# Patient Record
Sex: FEMALE | Race: WHITE | Employment: UNEMPLOYED | URBAN - METROPOLITAN AREA
[De-identification: names, ages, dates, MRNs, and addresses within clinical notes are randomized per-mention and may not be internally consistent; named-entity substitution may affect disease eponyms.]

---

## 2017-02-28 ENCOUNTER — PATIENT MESSAGE (OUTPATIENT)
Dept: FAMILY MEDICINE CLINIC | Facility: CLINIC | Age: 42
End: 2017-02-28

## 2017-03-01 NOTE — TELEPHONE ENCOUNTER
From: Elizabeth Valente  To: Jennifer Vasquez MD  Sent: 2/28/2017 4:17 PM CST  Subject: Prescription Question    Hello. I wanted to refill my prescription for clonazepam .5 mg and it said I had no refillable prescriptions on file. I'm confused.  On the maryann

## 2017-03-02 RX ORDER — CLONAZEPAM 0.5 MG/1
TABLET ORAL
Qty: 30 TABLET | Refills: 1
Start: 2017-03-02

## 2017-03-02 RX ORDER — CLONAZEPAM 0.5 MG/1
TABLET ORAL
Qty: 30 TABLET | Refills: 0 | Status: SHIPPED
Start: 2017-03-02 | End: 2017-04-23

## 2017-04-24 NOTE — TELEPHONE ENCOUNTER
Refill request, please approve or deny  LOV- 11/15/2016, was due to follow up in 3 months   No follow up scheduled

## 2017-04-25 RX ORDER — CLONAZEPAM 0.5 MG/1
TABLET ORAL
Qty: 30 TABLET | Refills: 0 | Status: SHIPPED | OUTPATIENT
Start: 2017-04-25 | End: 2017-07-26

## 2017-04-25 RX ORDER — CLONAZEPAM 0.5 MG/1
TABLET ORAL
Qty: 30 TABLET | Refills: 0
Start: 2017-04-25

## 2017-04-25 NOTE — TELEPHONE ENCOUNTER
Lucrecia Mc PA-C   [facility]   4 minutes ago   (8:16 AM)    Patient needs to have office visit and labs prior to next refill (Routing comment)

## 2017-05-03 ENCOUNTER — OFFICE VISIT (OUTPATIENT)
Dept: FAMILY MEDICINE CLINIC | Facility: CLINIC | Age: 42
End: 2017-05-03

## 2017-05-03 VITALS
RESPIRATION RATE: 18 BRPM | HEART RATE: 79 BPM | DIASTOLIC BLOOD PRESSURE: 70 MMHG | HEIGHT: 65 IN | OXYGEN SATURATION: 98 % | TEMPERATURE: 98 F | SYSTOLIC BLOOD PRESSURE: 120 MMHG | BODY MASS INDEX: 24.99 KG/M2 | WEIGHT: 150 LBS

## 2017-05-03 DIAGNOSIS — H65.93 MIDDLE EAR EFFUSION, BILATERAL: ICD-10-CM

## 2017-05-03 DIAGNOSIS — R09.81 SINUS CONGESTION: Primary | ICD-10-CM

## 2017-05-03 DIAGNOSIS — IMO0001 VERTIGO OF CENTRAL ORIGIN OF BOTH EARS: ICD-10-CM

## 2017-05-03 PROCEDURE — 99213 OFFICE O/P EST LOW 20 MIN: CPT | Performed by: NURSE PRACTITIONER

## 2017-05-03 RX ORDER — FLUOXETINE HYDROCHLORIDE 40 MG/1
CAPSULE ORAL
Qty: 90 CAPSULE | Refills: 1 | Status: SHIPPED | OUTPATIENT
Start: 2017-05-03 | End: 2017-07-26

## 2017-05-03 RX ORDER — FLUOXETINE HYDROCHLORIDE 40 MG/1
40 CAPSULE ORAL DAILY
COMMUNITY
End: 2017-07-26 | Stop reason: CLARIF

## 2017-05-03 RX ORDER — MECLIZINE HYDROCHLORIDE 25 MG/1
25 TABLET ORAL 3 TIMES DAILY PRN
Qty: 15 TABLET | Refills: 0 | Status: SHIPPED | OUTPATIENT
Start: 2017-05-03 | End: 2017-05-08

## 2017-05-03 NOTE — PROGRESS NOTES
CHIEF COMPLAINT:   Patient presents with:  URI: post nasal drip, ear fullness, vertigo X 1 day      HPI:   Ilia Stinson is a 38year old female who presents to clinic today with complaints of bilateral ear pain. Has had for 1  days.  Pain is descr EARS: bilateral tragus not tender with manipulation. External auditory canals clear. Right TM: without erythema, no bulging, noretraction,+ effusion, bony landmarks visible.   Left TM: without erythema, no bulging, no retraction,+ effusion, bony landmarks This is the most common cause of vertigo. BPV is also called benign positional paroxysmal vertigo (BPPV). It happens when crystals in the ear canals shift into the wrong place. Vertigo usually occurs when you move your head in a certain way.  This can happe © 4483-6682 [address], [address]. All rights reserved. This information is not intended as a substitute for professional medical care. Always follow your healthcare professional's instructions.               P

## 2017-05-03 NOTE — PATIENT INSTRUCTIONS
Humidifier in room  Sleep propped  Push fluids  Limit dairy  Flonase 2 sprays each nostril daily  Sudafed as needed  GO TO ER FOR WORSENING SYMPTOMS OR NO IMPROVEMENT    Inner Ear Problems: Causes of Dizziness (Vertigo)  Benign positional vertigo (BPV) · Cause a feeling of fullness or pressure in the ear  · Cause any of these symptoms: vertigo, hearing loss, tinnitus, or ear fullness to last a lifetime  Date Last Reviewed: 11/1/2016  © 6881-5695 The [address] [place] [address],

## 2017-07-25 RX ORDER — CLONAZEPAM 0.5 MG/1
TABLET ORAL
Qty: 30 TABLET | Refills: 0
Start: 2017-07-25

## 2017-07-26 NOTE — PROGRESS NOTES
Camille Lee is a 38year old female. Patient presents with:  Medication Follow-Up: Pt is following up on Fluoxetina and Clonazepam       HPI:   Pt  F/u anxiety. Patient is taking prozac daily and Klonopin at night.  She has been on medications f 40 MG Oral Cap; Take 1 capsule (40 mg total) by mouth daily. -     ClonazePAM (KLONOPIN) 0.5 MG Oral Tab; Take 1 tablet (0.5 mg total) by mouth nightly.   - Stable with above medications  - Recheck in 3 months     Screening for breast cancer  Orders:  -

## 2017-10-17 DIAGNOSIS — F41.1 GAD (GENERALIZED ANXIETY DISORDER): ICD-10-CM

## 2017-10-17 RX ORDER — CLONAZEPAM 0.5 MG/1
TABLET ORAL
Qty: 30 TABLET | Refills: 0 | Status: SHIPPED
Start: 2017-10-17 | End: 2018-01-11

## 2017-10-17 NOTE — TELEPHONE ENCOUNTER
Medication(s) to Refill:   Pending Prescriptions Disp Refills    CLONAZEPAM 0.5 MG Oral Tab [Pharmacy Med Name: CLONAZEPAM 0.5 MG TABLET] 30 tablet 0     Sig: TAKE 1 TABLET BY MOUTH NIGHTLY           Last Time Medication was Filled:  7/26/2017    Last Offi

## 2017-10-18 DIAGNOSIS — F41.1 GAD (GENERALIZED ANXIETY DISORDER): ICD-10-CM

## 2017-10-18 RX ORDER — CLONAZEPAM 0.5 MG/1
TABLET ORAL
Qty: 30 TABLET | Refills: 0 | OUTPATIENT
Start: 2017-10-18

## 2018-01-11 DIAGNOSIS — F41.1 GAD (GENERALIZED ANXIETY DISORDER): ICD-10-CM

## 2018-01-11 RX ORDER — CLONAZEPAM 0.5 MG/1
0.5 TABLET ORAL NIGHTLY PRN
Qty: 30 TABLET | Refills: 0 | Status: SHIPPED
Start: 2018-01-11 | End: 2018-03-30

## 2018-01-11 RX ORDER — FLUOXETINE HYDROCHLORIDE 40 MG/1
CAPSULE ORAL
Qty: 90 CAPSULE | Refills: 0 | Status: SHIPPED | OUTPATIENT
Start: 2018-01-11 | End: 2018-03-30

## 2018-01-11 NOTE — TELEPHONE ENCOUNTER
From: Lizeth Gomez  Sent: 1/11/2018 5:16 AM CST  Subject: Medication Renewal Request    Barabara Severin.  Ambrosio would like a refill of the following medications:     FLUoxetine HCl 40 MG Oral Cap [Marylin Pinzon PA-C]     CLONAZEPAM 0.5 MG Oral

## 2018-01-11 NOTE — TELEPHONE ENCOUNTER
Klonopin LF: 10/17/17  Fluoxetine LF: 7/26/17  LOV: 7/26/17  Please approve or deny pending Rx. Thank you!

## 2018-02-17 DIAGNOSIS — K64.9 HEMORRHOIDS, UNSPECIFIED HEMORRHOID TYPE: Primary | ICD-10-CM

## 2018-03-30 DIAGNOSIS — F41.1 GAD (GENERALIZED ANXIETY DISORDER): ICD-10-CM

## 2018-03-30 RX ORDER — CLONAZEPAM 0.5 MG/1
0.5 TABLET ORAL NIGHTLY PRN
Qty: 30 TABLET | Refills: 0 | Status: SHIPPED
Start: 2018-03-30 | End: 2018-07-25

## 2018-03-30 RX ORDER — FLUOXETINE HYDROCHLORIDE 40 MG/1
CAPSULE ORAL
Qty: 90 CAPSULE | Refills: 0 | Status: SHIPPED
Start: 2018-03-30 | End: 2018-07-25

## 2018-03-30 NOTE — TELEPHONE ENCOUNTER
From: Clint Russell  Sent: 3/30/2018 7:39 AM CDT  Subject: Medication Renewal Request    Elizabeth Brooks.  Ambrosio would like a refill of the following medications:     FLUoxetine HCl 40 MG Oral Cap Marcus Johnston MD]     ClonazePAM 0.5 MG Oral Tab [Omar

## 2018-04-10 DIAGNOSIS — F41.1 GAD (GENERALIZED ANXIETY DISORDER): ICD-10-CM

## 2018-04-11 RX ORDER — FLUOXETINE HYDROCHLORIDE 40 MG/1
CAPSULE ORAL
Qty: 90 CAPSULE | Refills: 0 | Status: SHIPPED | OUTPATIENT
Start: 2018-04-11 | End: 2018-07-25

## 2018-07-25 ENCOUNTER — LAB ENCOUNTER (OUTPATIENT)
Dept: LAB | Age: 43
End: 2018-07-25
Attending: FAMILY MEDICINE
Payer: COMMERCIAL

## 2018-07-25 ENCOUNTER — OFFICE VISIT (OUTPATIENT)
Dept: FAMILY MEDICINE CLINIC | Facility: CLINIC | Age: 43
End: 2018-07-25
Payer: COMMERCIAL

## 2018-07-25 VITALS
TEMPERATURE: 98 F | SYSTOLIC BLOOD PRESSURE: 118 MMHG | DIASTOLIC BLOOD PRESSURE: 78 MMHG | RESPIRATION RATE: 16 BRPM | WEIGHT: 163 LBS | BODY MASS INDEX: 27.49 KG/M2 | HEIGHT: 64.5 IN | HEART RATE: 98 BPM

## 2018-07-25 DIAGNOSIS — Z13.21 SCREENING FOR ENDOCRINE, NUTRITIONAL, METABOLIC AND IMMUNITY DISORDER: ICD-10-CM

## 2018-07-25 DIAGNOSIS — Z13.29 SCREENING FOR ENDOCRINE, NUTRITIONAL, METABOLIC AND IMMUNITY DISORDER: ICD-10-CM

## 2018-07-25 DIAGNOSIS — F41.1 GAD (GENERALIZED ANXIETY DISORDER): ICD-10-CM

## 2018-07-25 DIAGNOSIS — Z13.228 SCREENING FOR ENDOCRINE, NUTRITIONAL, METABOLIC AND IMMUNITY DISORDER: ICD-10-CM

## 2018-07-25 DIAGNOSIS — Z13.0 SCREENING FOR ENDOCRINE, NUTRITIONAL, METABOLIC AND IMMUNITY DISORDER: ICD-10-CM

## 2018-07-25 DIAGNOSIS — D64.9 LOW HEMOGLOBIN: ICD-10-CM

## 2018-07-25 DIAGNOSIS — Z12.31 ENCOUNTER FOR SCREENING MAMMOGRAM FOR MALIGNANT NEOPLASM OF BREAST: ICD-10-CM

## 2018-07-25 DIAGNOSIS — D17.9 LIPOMA, UNSPECIFIED SITE: Primary | ICD-10-CM

## 2018-07-25 LAB
ALBUMIN SERPL-MCNC: 3.8 G/DL (ref 3.5–4.8)
ALBUMIN/GLOB SERPL: 1.1 {RATIO} (ref 1–2)
ALP LIVER SERPL-CCNC: 61 U/L (ref 37–98)
ALT SERPL-CCNC: 17 U/L (ref 14–54)
ANION GAP SERPL CALC-SCNC: 5 MMOL/L (ref 0–18)
AST SERPL-CCNC: 18 U/L (ref 15–41)
BASOPHILS # BLD AUTO: 0.03 X10(3) UL (ref 0–0.1)
BASOPHILS NFR BLD AUTO: 0.4 %
BILIRUB SERPL-MCNC: 1 MG/DL (ref 0.1–2)
BUN BLD-MCNC: 12 MG/DL (ref 8–20)
BUN/CREAT SERPL: 22.2 (ref 10–20)
CALCIUM BLD-MCNC: 8.9 MG/DL (ref 8.3–10.3)
CHLORIDE SERPL-SCNC: 105 MMOL/L (ref 101–111)
CHOLEST SMN-MCNC: 186 MG/DL (ref ?–200)
CO2 SERPL-SCNC: 29 MMOL/L (ref 22–32)
CREAT BLD-MCNC: 0.54 MG/DL (ref 0.55–1.02)
EOSINOPHIL # BLD AUTO: 0.05 X10(3) UL (ref 0–0.3)
EOSINOPHIL NFR BLD AUTO: 0.7 %
ERYTHROCYTE [DISTWIDTH] IN BLOOD BY AUTOMATED COUNT: 15.7 % (ref 11.5–16)
GLOBULIN PLAS-MCNC: 3.6 G/DL (ref 2.5–3.7)
GLUCOSE BLD-MCNC: 81 MG/DL (ref 70–99)
HCT VFR BLD AUTO: 33.7 % (ref 34–50)
HDLC SERPL-MCNC: 67 MG/DL (ref 40–59)
HGB BLD-MCNC: 10.7 G/DL (ref 12–16)
IMMATURE GRANULOCYTE COUNT: 0.02 X10(3) UL (ref 0–1)
IMMATURE GRANULOCYTE RATIO %: 0.3 %
LDLC SERPL CALC-MCNC: 111 MG/DL (ref ?–100)
LYMPHOCYTES # BLD AUTO: 2.58 X10(3) UL (ref 0.9–4)
LYMPHOCYTES NFR BLD AUTO: 35 %
M PROTEIN MFR SERPL ELPH: 7.4 G/DL (ref 6.1–8.3)
MCH RBC QN AUTO: 26.1 PG (ref 27–33.2)
MCHC RBC AUTO-ENTMCNC: 31.8 G/DL (ref 31–37)
MCV RBC AUTO: 82.2 FL (ref 81–100)
MONOCYTES # BLD AUTO: 0.5 X10(3) UL (ref 0.1–1)
MONOCYTES NFR BLD AUTO: 6.8 %
NEUTROPHIL ABS PRELIM: 4.2 X10 (3) UL (ref 1.3–6.7)
NEUTROPHILS # BLD AUTO: 4.2 X10(3) UL (ref 1.3–6.7)
NEUTROPHILS NFR BLD AUTO: 56.8 %
NONHDLC SERPL-MCNC: 119 MG/DL (ref ?–130)
OSMOLALITY SERPL CALC.SUM OF ELEC: 287 MOSM/KG (ref 275–295)
PLATELET # BLD AUTO: 269 10(3)UL (ref 150–450)
POTASSIUM SERPL-SCNC: 3.9 MMOL/L (ref 3.6–5.1)
RBC # BLD AUTO: 4.1 X10(6)UL (ref 3.8–5.1)
RED CELL DISTRIBUTION WIDTH-SD: 46.3 FL (ref 35.1–46.3)
SODIUM SERPL-SCNC: 139 MMOL/L (ref 136–144)
TRIGL SERPL-MCNC: 38 MG/DL (ref 30–149)
TSI SER-ACNC: 1.23 MIU/ML (ref 0.35–5.5)
VLDLC SERPL CALC-MCNC: 8 MG/DL (ref 0–30)
WBC # BLD AUTO: 7.4 X10(3) UL (ref 4–13)

## 2018-07-25 PROCEDURE — 82728 ASSAY OF FERRITIN: CPT | Performed by: FAMILY MEDICINE

## 2018-07-25 PROCEDURE — 83550 IRON BINDING TEST: CPT | Performed by: FAMILY MEDICINE

## 2018-07-25 PROCEDURE — 80050 GENERAL HEALTH PANEL: CPT | Performed by: FAMILY MEDICINE

## 2018-07-25 PROCEDURE — 80061 LIPID PANEL: CPT | Performed by: FAMILY MEDICINE

## 2018-07-25 PROCEDURE — 83540 ASSAY OF IRON: CPT | Performed by: FAMILY MEDICINE

## 2018-07-25 PROCEDURE — 36415 COLL VENOUS BLD VENIPUNCTURE: CPT | Performed by: FAMILY MEDICINE

## 2018-07-25 PROCEDURE — 99214 OFFICE O/P EST MOD 30 MIN: CPT | Performed by: FAMILY MEDICINE

## 2018-07-25 RX ORDER — CLONAZEPAM 0.5 MG/1
0.5 TABLET ORAL NIGHTLY PRN
Qty: 30 TABLET | Refills: 3 | Status: SHIPPED
Start: 2018-07-25 | End: 2019-03-06

## 2018-07-25 RX ORDER — FLUOXETINE HYDROCHLORIDE 40 MG/1
CAPSULE ORAL
Qty: 90 CAPSULE | Refills: 1 | Status: SHIPPED | OUTPATIENT
Start: 2018-07-25 | End: 2019-04-01

## 2018-07-27 ENCOUNTER — TELEPHONE (OUTPATIENT)
Dept: FAMILY MEDICINE CLINIC | Facility: CLINIC | Age: 43
End: 2018-07-27

## 2018-07-27 DIAGNOSIS — D64.9 LOW HEMOGLOBIN: Primary | ICD-10-CM

## 2018-07-27 LAB
DEPRECATED HBV CORE AB SER IA-ACNC: 4.4 NG/ML (ref 12–240)
IRON SATURATION: 10 % (ref 15–50)
IRON: 51 UG/DL (ref 28–170)
TOTAL IRON BINDING CAPACITY: 520 UG/DL (ref 240–450)
TRANSFERRIN SERPL-MCNC: 349 MG/DL (ref 200–360)
TRANSFERRIN SERPL-MCNC: 349 MG/DL (ref 200–360)

## 2018-07-27 NOTE — PROGRESS NOTES
Chief Complaint:   Patient presents with:  Bump: noticed about 3 months ago    HPI:   This is a 37year old female presenting with cyst along the right lower back reports she noticed it about 3-6 months ago swollen and flesh-colored nontender no surroundin change. Eyes: Negative for photophobia, pain, discharge, redness, itching and visual disturbance. Respiratory: Negative for cough, chest tightness and shortness of breath. Cardiovascular: Negative for chest pain, palpitations and leg swelling.    Rohit Barney Conjunctivae are normal. Pupils are equal, round, and reactive to light. Right eye exhibits no discharge. Left eye exhibits no discharge. No scleral icterus. Neck: Normal range of motion. Neck supple. No JVD present. No tracheal deviation present.  No thy JOEL (generalized anxiety disorder)  -stable, CPM  - ClonazePAM 0.5 MG Oral Tab; Take 1 tablet (0.5 mg total) by mouth nightly as needed for Anxiety. Dispense: 30 tablet;  Refill: 3  - FLUoxetine HCl 40 MG Oral Cap; TAKE 1 CAPSULE (40 MG TOTAL) BY MOUTH IRAIS

## 2018-07-27 NOTE — TELEPHONE ENCOUNTER
----- Message from Yen Benites MD sent at 7/27/2018  4:08 PM CDT -----  Patient has low hemoglobin needs iron studies serum iron, ferritin, TIBC, transferrin

## 2018-07-27 NOTE — TELEPHONE ENCOUNTER
Iron studies ordered and added onto existing blood specimen. I called and confirmed with tech at main lab that labs can be processed with blood from 7/25/18 draw. Labs to be run today. Will ebenezer for f/u Monday.

## 2019-02-05 PROBLEM — F32.A DEPRESSION: Status: ACTIVE | Noted: 2019-02-05

## 2019-02-05 PROBLEM — N81.4 UTEROVAGINAL PROLAPSE: Status: ACTIVE | Noted: 2019-02-05

## 2019-02-05 PROCEDURE — 88175 CYTOPATH C/V AUTO FLUID REDO: CPT | Performed by: NURSE PRACTITIONER

## 2019-02-05 PROCEDURE — 87624 HPV HI-RISK TYP POOLED RSLT: CPT | Performed by: NURSE PRACTITIONER

## 2019-02-25 ENCOUNTER — HOSPITAL ENCOUNTER (OUTPATIENT)
Dept: MAMMOGRAPHY | Age: 44
Discharge: HOME | End: 2019-02-25
Attending: NURSE PRACTITIONER
Payer: COMMERCIAL

## 2019-02-25 DIAGNOSIS — Z12.31 BREAST CANCER SCREENING BY MAMMOGRAM: ICD-10-CM

## 2019-02-25 PROCEDURE — 77067 SCR MAMMO BI INCL CAD: CPT | Performed by: NURSE PRACTITIONER

## 2019-02-25 PROCEDURE — 77063 BREAST TOMOSYNTHESIS BI: CPT | Performed by: NURSE PRACTITIONER

## 2019-03-06 DIAGNOSIS — F41.1 GAD (GENERALIZED ANXIETY DISORDER): ICD-10-CM

## 2019-03-06 RX ORDER — CLONAZEPAM 0.5 MG/1
TABLET ORAL
Qty: 30 TABLET | Refills: 0 | Status: SHIPPED
Start: 2019-03-06 | End: 2019-04-01

## 2019-03-06 NOTE — TELEPHONE ENCOUNTER
Medication(s) to Refill:   Requested Prescriptions     Pending Prescriptions Disp Refills   • CLONAZEPAM 0.5 MG Oral Tab [Pharmacy Med Name: CLONAZEPAM 0.5 MG TABLET] 30 tablet 0     Sig: TAKE 1 TABLET BY MOUTH NIGHTLY AS NEEDED FOR ANXIETY         Reason

## 2019-03-07 ENCOUNTER — HOSPITAL ENCOUNTER (OUTPATIENT)
Dept: ULTRASOUND IMAGING | Age: 44
Discharge: HOME | End: 2019-03-07
Attending: NURSE PRACTITIONER
Payer: COMMERCIAL

## 2019-03-07 ENCOUNTER — HOSPITAL ENCOUNTER (OUTPATIENT)
Dept: MAMMOGRAPHY | Age: 44
Discharge: HOME | End: 2019-03-07
Attending: NURSE PRACTITIONER
Payer: COMMERCIAL

## 2019-03-07 DIAGNOSIS — R92.2 BREAST DENSITY: ICD-10-CM

## 2019-03-07 DIAGNOSIS — R92.1 BREAST CALCIFICATIONS: ICD-10-CM

## 2019-03-07 PROCEDURE — 77066 DX MAMMO INCL CAD BI: CPT | Performed by: NURSE PRACTITIONER

## 2019-03-07 PROCEDURE — 77062 BREAST TOMOSYNTHESIS BI: CPT | Performed by: NURSE PRACTITIONER

## 2019-03-07 PROCEDURE — 76642 ULTRASOUND BREAST LIMITED: CPT | Performed by: NURSE PRACTITIONER

## 2019-03-12 ENCOUNTER — OFFICE VISIT (OUTPATIENT)
Dept: SURGERY | Facility: CLINIC | Age: 44
End: 2019-03-12
Payer: COMMERCIAL

## 2019-03-12 ENCOUNTER — OFFICE VISIT (OUTPATIENT)
Dept: HEMATOLOGY/ONCOLOGY | Facility: HOSPITAL | Age: 44
End: 2019-03-12
Attending: SURGERY
Payer: COMMERCIAL

## 2019-03-12 ENCOUNTER — TELEPHONE (OUTPATIENT)
Dept: SURGERY | Facility: CLINIC | Age: 44
End: 2019-03-12

## 2019-03-12 VITALS
RESPIRATION RATE: 18 BRPM | BODY MASS INDEX: 25.24 KG/M2 | HEIGHT: 65 IN | HEART RATE: 65 BPM | SYSTOLIC BLOOD PRESSURE: 133 MMHG | WEIGHT: 151.5 LBS | TEMPERATURE: 99 F | OXYGEN SATURATION: 98 % | DIASTOLIC BLOOD PRESSURE: 79 MMHG

## 2019-03-12 DIAGNOSIS — R92.8 ABNORMAL MAMMOGRAM OF RIGHT BREAST: Primary | ICD-10-CM

## 2019-03-12 DIAGNOSIS — R92.8 ABNORMAL MAMMOGRAM OF RIGHT BREAST: ICD-10-CM

## 2019-03-12 PROCEDURE — 99243 OFF/OP CNSLTJ NEW/EST LOW 30: CPT | Performed by: SURGERY

## 2019-03-12 PROCEDURE — 99211 OFF/OP EST MAY X REQ PHY/QHP: CPT

## 2019-03-12 RX ORDER — ALPRAZOLAM 0.5 MG/1
0.5 TABLET ORAL 2 TIMES DAILY PRN
Qty: 30 TABLET | Refills: 0 | Status: SHIPPED | OUTPATIENT
Start: 2019-03-12 | End: 2019-04-01

## 2019-03-12 NOTE — TELEPHONE ENCOUNTER
Called patient on home phone to go over Breast US Guided Biopsy checklist. Reviewed checklist with patient. Patient verbalized understanding. Patient will receive a copy of the checklist in the mail.

## 2019-03-12 NOTE — PROGRESS NOTES
Patient is here today for Consult  with Dr. Alison Curtis / Natividad Mendoza. Patient Denies pain. Medication list and medical history were reviewed and updated.     Education Record    Learner:  Patient and Mother    Disease / Diagnosis: Natividad Mendoza / Dr. Alison Curtis -

## 2019-03-15 PROBLEM — R92.8 ABNORMAL MAMMOGRAM OF RIGHT BREAST: Status: ACTIVE | Noted: 2019-03-15

## 2019-03-15 NOTE — H&P
HPI: HPI  The patient is a 24-year-old female seen at the request of her gynecologist regarding an abnormal right breast mammogram.  The patient underwent routine imaging, followed by additional views. She has noticed no changes to her breasts.   The b polyphagia  ENMT:  Negative for ear drainage, hearing loss and nasal drainage  Eyes:  Negative for eye discharge and vision loss  Gastrointestinal:  Negative for abdominal pain, constipation, decreased appetite, diarrhea and vomiting  Genitourinary:  Negat cervical: No superficial cervical, no deep cervical and no posterior cervical adenopathy present. Left cervical: No superficial cervical, no deep cervical and no posterior cervical adenopathy present.         Right axillary: No pectoral and no lateral was evaluated with a computer-aided device.   This patient's information has been entered into a reminder system with a target due date for the next mammogram.              Dictated by: Palmer Elder MD on 3/07/2019     Assessment/Plan:   Right breast no

## 2019-03-18 ENCOUNTER — TELEPHONE (OUTPATIENT)
Dept: MAMMOGRAPHY | Facility: HOSPITAL | Age: 44
End: 2019-03-18

## 2019-03-18 NOTE — TELEPHONE ENCOUNTER
This [facility] RN phoned pt re RB US biopsy recommendation by Dr. Danielle Finn. Procedure reviewed and all questions answered. Emotional support given. Confirmed pt did receive educational handouts.  Pt instructed to stop all blood thinners for 3 days befor

## 2019-04-01 ENCOUNTER — TELEPHONE (OUTPATIENT)
Dept: MAMMOGRAPHY | Facility: HOSPITAL | Age: 44
End: 2019-04-01

## 2019-04-01 DIAGNOSIS — F41.1 GAD (GENERALIZED ANXIETY DISORDER): ICD-10-CM

## 2019-04-01 DIAGNOSIS — R92.8 ABNORMAL MAMMOGRAM OF RIGHT BREAST: ICD-10-CM

## 2019-04-01 RX ORDER — FLUOXETINE HYDROCHLORIDE 40 MG/1
CAPSULE ORAL
Qty: 30 CAPSULE | Refills: 0 | Status: SHIPPED | OUTPATIENT
Start: 2019-04-01 | End: 2019-06-20

## 2019-04-01 RX ORDER — ALPRAZOLAM 0.5 MG/1
0.5 TABLET ORAL 2 TIMES DAILY PRN
Qty: 30 TABLET | Refills: 0 | Status: SHIPPED
Start: 2019-04-01 | End: 2019-07-22

## 2019-04-01 RX ORDER — CLONAZEPAM 0.5 MG/1
TABLET ORAL
Qty: 30 TABLET | Refills: 0 | Status: SHIPPED
Start: 2019-04-01 | End: 2019-07-22

## 2019-04-01 NOTE — TELEPHONE ENCOUNTER
LOV: 7/25/18  Xanax LF: 3/12/19 by Dr. Luca Mcdonough  Clonazepam LF: 3/6/19  Fluoxetine LF: 7/25/18    Patient lost her medications while traveling. Please approve or deny pending Rx. Thank you!

## 2019-04-01 NOTE — TELEPHONE ENCOUNTER
Pt lost her suitcase while traveling. All of patients medications were in that suitcase.  Pt needs refills of ALPRAZolam (XANAX) 0.5 MG Oral Tab   CLONAZEPAM 0.5 MG Oral Tab   FLUoxetine HCl 40 MG Oral Cap   Pt doesn't use mychart so please call with Jeana Ventura

## 2019-04-01 NOTE — TELEPHONE ENCOUNTER
Pt is requesting valium prior to breast bx at [place]. Notified Dr. Kieran Mayberry office pt will need Rx and bring with her to appt, LMOVM of pt w CB #.  Will need to instruct pt to arrive 45 min before bx, and to take the Rx after signing consent, prior to bx

## 2019-04-03 ENCOUNTER — TELEPHONE (OUTPATIENT)
Dept: SURGERY | Facility: CLINIC | Age: 44
End: 2019-04-03

## 2019-04-03 NOTE — TELEPHONE ENCOUNTER
Pt phoned office, having biopsy with Dr. Aryan Yun tomorrow. Valium not ordered per pharmacy. Will phone prescription.

## 2019-04-03 NOTE — TELEPHONE ENCOUNTER
PT SCHEDULED FOR BIOPSY 4/4/19, VALIUM 10MG TAB DISP 1 TAB-INSTRUCTIONS TO BRING TO SCHEDULED APPT-ORDERED PER DR. MCKEON.  PHONED ORDER TO [facility]-LEFT VOICE MESSAGE FOR PT THAT MEDICATION WAS PHONED IN

## 2019-04-04 ENCOUNTER — HOSPITAL ENCOUNTER (OUTPATIENT)
Dept: MAMMOGRAPHY | Age: 44
Discharge: HOME | End: 2019-04-04
Attending: SURGERY
Payer: COMMERCIAL

## 2019-04-04 ENCOUNTER — HOSPITAL ENCOUNTER (OUTPATIENT)
Dept: ULTRASOUND IMAGING | Age: 44
Discharge: HOME | End: 2019-04-04
Attending: SURGERY
Payer: COMMERCIAL

## 2019-04-04 DIAGNOSIS — N63.0 BREAST MASS: ICD-10-CM

## 2019-04-04 PROCEDURE — 88305 TISSUE EXAM BY PATHOLOGIST: CPT | Performed by: SURGERY

## 2019-04-04 PROCEDURE — 19083 BX BREAST 1ST LESION US IMAG: CPT | Performed by: SURGERY

## 2019-04-04 PROCEDURE — 77065 DX MAMMO INCL CAD UNI: CPT | Performed by: SURGERY

## 2019-04-04 NOTE — IMAGING NOTE
Pt arrived with her . Procedure explained throughout and all questions answered. Consent and discharge paperwork signed by Tangela Stewart. Right breast positioned. Assisted Dr. Leonard Morrison with US guided biopsy.  Pt tolerated well.  Pressure held

## 2019-04-17 ENCOUNTER — OFFICE VISIT (OUTPATIENT)
Dept: SURGERY | Facility: CLINIC | Age: 44
End: 2019-04-17
Payer: COMMERCIAL

## 2019-04-17 VITALS
WEIGHT: 151 LBS | HEART RATE: 67 BPM | SYSTOLIC BLOOD PRESSURE: 128 MMHG | HEIGHT: 65 IN | BODY MASS INDEX: 25.16 KG/M2 | DIASTOLIC BLOOD PRESSURE: 82 MMHG | TEMPERATURE: 98 F

## 2019-04-17 DIAGNOSIS — N60.11 FIBROCYSTIC CHANGES OF RIGHT BREAST: Primary | ICD-10-CM

## 2019-04-17 PROCEDURE — 99212 OFFICE O/P EST SF 10 MIN: CPT | Performed by: SURGERY

## 2019-04-17 NOTE — PROGRESS NOTES
Post Operative Visit Note       Active Problems  1.  Fibrocystic changes of right breast         Chief Complaint   Patient presents with:  Biopsy Results: p/o US BREAST BIOPSY WITH CLIP 1 SITE RIGHT on 4/14, denies any pain         History of Present Illnes tablet, Rfl: 0  •  ALPRAZolam (XANAX) 0.5 MG Oral Tab, Take 1 tablet (0.5 mg total) by mouth 2 (two) times daily as needed for Sleep or Anxiety. , Disp: 30 tablet, Rfl: 0  •  FLUoxetine HCl 40 MG Oral Cap, TAKE 1 CAPSULE (40 MG TOTAL) BY MOUTH DAILY. , Disp: change and no tenderness. Right breast biopsy site healing well with minimal, yellow ecchymosis   Neurological: She is alert and oriented to person, place, and time. Skin: Skin is intact. She is not diaphoretic.    Psychiatric: She has a normal mood and

## 2019-06-20 DIAGNOSIS — F41.1 GAD (GENERALIZED ANXIETY DISORDER): ICD-10-CM

## 2019-06-20 RX ORDER — FLUOXETINE HYDROCHLORIDE 40 MG/1
CAPSULE ORAL
Qty: 30 CAPSULE | Refills: 0 | Status: SHIPPED | OUTPATIENT
Start: 2019-06-20 | End: 2019-08-03

## 2019-06-20 NOTE — TELEPHONE ENCOUNTER
Medication(s) to Refill:   Requested Prescriptions     Pending Prescriptions Disp Refills   • FLUoxetine HCl 40 MG Oral Cap 30 capsule 0     Sig: TAKE 1 CAPSULE (40 MG TOTAL) BY MOUTH DAILY.          Reason for Medication Refill being sent to Provider / Manpreet Fuentes

## 2019-07-22 DIAGNOSIS — F41.1 GAD (GENERALIZED ANXIETY DISORDER): ICD-10-CM

## 2019-07-22 DIAGNOSIS — R92.8 ABNORMAL MAMMOGRAM OF RIGHT BREAST: ICD-10-CM

## 2019-07-22 RX ORDER — CLONAZEPAM 0.5 MG/1
TABLET ORAL
Qty: 30 TABLET | Refills: 0 | Status: SHIPPED
Start: 2019-07-22 | End: 2019-08-23

## 2019-07-22 RX ORDER — CLONAZEPAM 0.5 MG/1
TABLET ORAL
Qty: 30 TABLET | Refills: 0 | Status: CANCELLED | OUTPATIENT
Start: 2019-07-22

## 2019-07-22 RX ORDER — ALPRAZOLAM 0.5 MG/1
0.5 TABLET ORAL 2 TIMES DAILY PRN
Qty: 30 TABLET | Refills: 0 | Status: CANCELLED | OUTPATIENT
Start: 2019-07-22

## 2019-07-22 RX ORDER — ALPRAZOLAM 0.5 MG/1
0.5 TABLET ORAL 2 TIMES DAILY PRN
Qty: 30 TABLET | Refills: 0 | Status: SHIPPED
Start: 2019-07-22 | End: 2019-08-23

## 2019-08-03 DIAGNOSIS — F41.1 GAD (GENERALIZED ANXIETY DISORDER): ICD-10-CM

## 2019-08-03 NOTE — TELEPHONE ENCOUNTER
Medication(s) to Refill:   Requested Prescriptions     Pending Prescriptions Disp Refills   • FLUoxetine HCl 40 MG Oral Cap [Pharmacy Med Name: FLUOXETINE HCL 40 MG CAPSULE] 30 capsule 0     Sig: TAKE 1 CAPSULE BY MOUTH EVERY DAY         Reason for Medicat

## 2019-08-05 RX ORDER — FLUOXETINE HYDROCHLORIDE 40 MG/1
CAPSULE ORAL
Qty: 30 CAPSULE | Refills: 0 | Status: SHIPPED | OUTPATIENT
Start: 2019-08-05 | End: 2019-08-23

## 2019-08-23 ENCOUNTER — OFFICE VISIT (OUTPATIENT)
Dept: FAMILY MEDICINE CLINIC | Facility: CLINIC | Age: 44
End: 2019-08-23
Payer: COMMERCIAL

## 2019-08-23 VITALS
TEMPERATURE: 98 F | HEART RATE: 68 BPM | SYSTOLIC BLOOD PRESSURE: 112 MMHG | RESPIRATION RATE: 16 BRPM | BODY MASS INDEX: 25.99 KG/M2 | WEIGHT: 156 LBS | HEIGHT: 65 IN | DIASTOLIC BLOOD PRESSURE: 76 MMHG

## 2019-08-23 DIAGNOSIS — F41.1 GAD (GENERALIZED ANXIETY DISORDER): ICD-10-CM

## 2019-08-23 DIAGNOSIS — Z00.00 LABORATORY EXAMINATION ORDERED AS PART OF A ROUTINE GENERAL MEDICAL EXAMINATION: Primary | ICD-10-CM

## 2019-08-23 DIAGNOSIS — R92.8 ABNORMAL MAMMOGRAM OF RIGHT BREAST: ICD-10-CM

## 2019-08-23 DIAGNOSIS — Z11.1 ENCOUNTER FOR PPD TEST: ICD-10-CM

## 2019-08-23 PROCEDURE — 99214 OFFICE O/P EST MOD 30 MIN: CPT | Performed by: FAMILY MEDICINE

## 2019-08-23 PROCEDURE — 86580 TB INTRADERMAL TEST: CPT | Performed by: FAMILY MEDICINE

## 2019-08-23 RX ORDER — CLONAZEPAM 0.5 MG/1
TABLET ORAL
Qty: 30 TABLET | Refills: 2 | Status: SHIPPED
Start: 2019-08-23 | End: 2019-10-18

## 2019-08-23 RX ORDER — ALPRAZOLAM 0.5 MG/1
0.5 TABLET ORAL 2 TIMES DAILY PRN
Qty: 30 TABLET | Refills: 2 | Status: SHIPPED
Start: 2019-08-23 | End: 2020-10-27 | Stop reason: CLARIF

## 2019-08-23 RX ORDER — FLUOXETINE HYDROCHLORIDE 40 MG/1
CAPSULE ORAL
Qty: 90 CAPSULE | Refills: 1 | Status: SHIPPED | OUTPATIENT
Start: 2019-08-23 | End: 2020-03-02

## 2019-08-26 ENCOUNTER — NURSE ONLY (OUTPATIENT)
Dept: FAMILY MEDICINE CLINIC | Facility: CLINIC | Age: 44
End: 2019-08-26
Payer: COMMERCIAL

## 2019-08-26 LAB — INDURATION (): 0 MM (ref 0–11)

## 2019-08-26 NOTE — PROGRESS NOTES
Chief Complaint:   Patient presents with:  Physical    HPI:   This is a 53year old female presenting for school physical, she reports no new complaints, mood's stable. Needs PPD. Patient's currently taking fluoxetine, klonopin prn.  Mood's stable she rep needed for Sleep or Anxiety. Disp: 30 tablet Rfl: 2   APPLE CIDER VINEGAR OR Take by mouth. Disp:  Rfl:       Counseling given: Not Answered       REVIEW OF SYSTEMS:   Review of Systems   Constitutional: Negative for chills, diaphoresis, fatigue and fever. well-developed and well-nourished. HENT:   Head: Normocephalic and atraumatic. Right Ear: Tympanic membrane and ear canal normal. Tympanic membrane is not erythematous.  No cerumen present  Left Ear: Tympanic membrane and ear canal normal. Tympanic memb Future  -     COMP METABOLIC PANEL (14); Future  -     LIPID PANEL;  Future  -     TSH W REFLEX TO FREE T4; Future    Encounter for PPD test  -     TB INTRADERMAL TEST    JOEL (generalized anxiety disorder)  -     FLUoxetine HCl 40 MG Oral Cap; TAKE 1 CAPSUL

## 2019-10-18 DIAGNOSIS — F41.1 GAD (GENERALIZED ANXIETY DISORDER): ICD-10-CM

## 2019-10-18 RX ORDER — CLONAZEPAM 0.5 MG/1
TABLET ORAL
Qty: 30 TABLET | Refills: 2 | Status: SHIPPED | OUTPATIENT
Start: 2019-10-18 | End: 2020-04-07

## 2019-10-18 NOTE — TELEPHONE ENCOUNTER
Medication(s) to Refill:   Requested Prescriptions     Pending Prescriptions Disp Refills   • clonazePAM 0.5 MG Oral Tab 30 tablet 2     Sig: TAKE 1 TABLET BY MOUTH NIGHTLY AS NEEDED FOR ANXIETY         Reason for Medication Refill being sent to Provider /

## 2020-02-09 ENCOUNTER — OFFICE VISIT (OUTPATIENT)
Dept: FAMILY MEDICINE CLINIC | Facility: CLINIC | Age: 45
End: 2020-02-09
Payer: COMMERCIAL

## 2020-02-09 VITALS
BODY MASS INDEX: 24.83 KG/M2 | WEIGHT: 149 LBS | TEMPERATURE: 98 F | RESPIRATION RATE: 18 BRPM | HEIGHT: 65 IN | SYSTOLIC BLOOD PRESSURE: 112 MMHG | OXYGEN SATURATION: 99 % | DIASTOLIC BLOOD PRESSURE: 82 MMHG | HEART RATE: 63 BPM

## 2020-02-09 DIAGNOSIS — B34.9 VIRAL SYNDROME: Primary | ICD-10-CM

## 2020-02-09 DIAGNOSIS — H69.83 DYSFUNCTION OF BOTH EUSTACHIAN TUBES: ICD-10-CM

## 2020-02-09 DIAGNOSIS — Z20.818 EXPOSURE TO STREP THROAT: ICD-10-CM

## 2020-02-09 LAB
CONTROL LINE PRESENT WITH A CLEAR BACKGROUND (YES/NO): YES YES/NO
KIT LOT #: NORMAL NUMERIC
OPERATOR ID: NORMAL
POCT INFLUENZA A: NEGATIVE
POCT INFLUENZA B: NEGATIVE
STREP GRP A CUL-SCR: NEGATIVE

## 2020-02-09 PROCEDURE — 87502 INFLUENZA DNA AMP PROBE: CPT | Performed by: NURSE PRACTITIONER

## 2020-02-09 PROCEDURE — 99213 OFFICE O/P EST LOW 20 MIN: CPT | Performed by: NURSE PRACTITIONER

## 2020-02-09 PROCEDURE — 87880 STREP A ASSAY W/OPTIC: CPT | Performed by: NURSE PRACTITIONER

## 2020-02-09 NOTE — PATIENT INSTRUCTIONS
· Most viral illnesses get worse for the first 3-5 days, then plateau and gradually improve over the next 3-5 days. · Use over the counter medications for comfort as needed.    · Acetaminophen or Ibuprofen according to package instructions as needed for b always helpful. Don't use nasal spray decongestants more than 3 days. Longer use can make congestion worse. Prescription nasal sprays from your doctor don't typically have those restrictions.   · Antihistamines may help if you are also having allergy sympto

## 2020-02-09 NOTE — PROGRESS NOTES
CHIEF COMPLAINT:   Patient presents with: Body ache and/or chills: muscle fatigue x 2 days  Runny Nose        HPI:   Hallie Yeung is a 50year old female presents to clinic with complaint of body aches and muscle fatigue.  Patient has had for 2 d REVIEW OF SYSTEMS:   GENERAL HEALTH: feels well otherwise, sl decreased appetite  SKIN: denies any unusual skin lesions or rashes  HEENT: See HPI  RESPIRATORY: denies shortness of breath or wheezing  CARDIOVASCULAR: denies chest pain or palpitations   GI: Collection Time: 02/09/20  9:20 AM   Result Value Ref Range    POCT INFLUENZA A Negative Negative    POCT INFLUENZA B Negative Negative    POCT Lot Number [id number]     POCT Expiration Date 07/08/2020     POCT Procedure Control Control Valid Control Valid · Sudafed or Afrin Nasal Spray as per package directions may help with nasal/sinus congestion. Do not use Afrin for longer than 3 days. Delsym for cough if needed.    · Zyrtec or Claritin can have a drying effect on fluid in your ears  · Follow up if no i Follow up with your healthcare provider or as advised if you are not feeling better after 3 days.   When to seek medical advice  Call your healthcare provider right away if any of the following occur:  · You develop ear pain  · Fever of 100.4°F (38°C) or hi

## 2020-03-02 DIAGNOSIS — F41.1 GAD (GENERALIZED ANXIETY DISORDER): ICD-10-CM

## 2020-03-02 RX ORDER — FLUOXETINE HYDROCHLORIDE 40 MG/1
CAPSULE ORAL
Qty: 90 CAPSULE | Refills: 1 | Status: SHIPPED | OUTPATIENT
Start: 2020-03-02 | End: 2020-08-11 | Stop reason: DRUGHIGH

## 2020-03-25 ENCOUNTER — TELEPHONE (OUTPATIENT)
Dept: FAMILY MEDICINE CLINIC | Facility: CLINIC | Age: 45
End: 2020-03-25

## 2020-03-25 DIAGNOSIS — J30.2 SEASONAL ALLERGIC RHINITIS, UNSPECIFIED TRIGGER: ICD-10-CM

## 2020-03-25 DIAGNOSIS — J01.01 ACUTE RECURRENT MAXILLARY SINUSITIS: Primary | ICD-10-CM

## 2020-03-25 PROCEDURE — 99441 PHONE E/M BY PHYS 5-10 MIN: CPT | Performed by: FAMILY MEDICINE

## 2020-03-25 RX ORDER — AZITHROMYCIN 250 MG/1
TABLET, FILM COATED ORAL
Qty: 6 TABLET | Refills: 0 | Status: SHIPPED | OUTPATIENT
Start: 2020-03-25 | End: 2020-10-27 | Stop reason: CLARIF

## 2020-03-25 RX ORDER — MONTELUKAST SODIUM 10 MG/1
10 TABLET ORAL DAILY
Qty: 30 TABLET | Refills: 3 | Status: SHIPPED | OUTPATIENT
Start: 2020-03-25 | End: 2020-04-24

## 2020-03-25 NOTE — TELEPHONE ENCOUNTER
Patient Forest Sheikh called to report symptoms of diarrhea, body aches and nasal congestion, no fever/phone visit [email or web address]

## 2020-03-25 NOTE — TELEPHONE ENCOUNTER
HPI:   Anh Britton is a 50year old female who presents for upper respiratory symptoms for  2  weeks. Patient reports sore throat, congestion, dry cough, sinus pain. + vertigo. One episode of loose stool.  No exposure with anyone who's sick and n History    Tobacco Use      Smoking status: Former Smoker        Packs/day: 0.50        Years: 8.00        Pack years: 4        Types: Cigarettes        Quit date: 2008        Years since quittin.1      Smokeless tobacco: Never Used    Alcohol use

## 2020-04-06 DIAGNOSIS — F41.1 GAD (GENERALIZED ANXIETY DISORDER): ICD-10-CM

## 2020-04-07 RX ORDER — CLONAZEPAM 0.5 MG/1
TABLET ORAL
Qty: 30 TABLET | Refills: 0 | Status: SHIPPED | OUTPATIENT
Start: 2020-04-07 | End: 2020-10-27 | Stop reason: CLARIF

## 2020-04-07 NOTE — TELEPHONE ENCOUNTER
Medication(s) to Refill:   Requested Prescriptions     Pending Prescriptions Disp Refills   • CLONAZEPAM 0.5 MG Oral Tab [Pharmacy Med Name: CLONAZEPAM 0.5 MG TABLET] 30 tablet 2     Sig: TAKE 1 TABLET BY MOUTH NIGHTLY AS NEEDED FOR ANXIETY         Reason

## 2020-06-22 ENCOUNTER — TELEPHONE (OUTPATIENT)
Dept: SURGERY | Facility: CLINIC | Age: 45
End: 2020-06-22

## 2020-06-22 DIAGNOSIS — N60.11 FIBROCYSTIC DISEASE OF RIGHT BREAST: Primary | ICD-10-CM

## 2020-06-22 NOTE — TELEPHONE ENCOUNTER
Pt called stated she was notified via [facility] that she is overdue for a 6 month repeat right breast mammogram. The order is , this writer reentered the order and provided central scheduling phone number for pt. Pt verbalized understanding.

## 2020-06-29 DIAGNOSIS — N60.11 FIBROCYSTIC BREAST, RIGHT: Primary | ICD-10-CM

## 2020-07-10 ENCOUNTER — HOSPITAL ENCOUNTER (OUTPATIENT)
Dept: MAMMOGRAPHY | Age: 45
Discharge: HOME | End: 2020-07-10
Attending: SURGERY
Payer: COMMERCIAL

## 2020-07-10 ENCOUNTER — HOSPITAL ENCOUNTER (OUTPATIENT)
Dept: ULTRASOUND IMAGING | Age: 45
Discharge: HOME | End: 2020-07-10
Attending: SURGERY
Payer: COMMERCIAL

## 2020-07-10 DIAGNOSIS — N60.11 FIBROCYSTIC BREAST, RIGHT: ICD-10-CM

## 2020-07-10 PROCEDURE — 77066 DX MAMMO INCL CAD BI: CPT | Performed by: SURGERY

## 2020-07-10 PROCEDURE — 77062 BREAST TOMOSYNTHESIS BI: CPT | Performed by: SURGERY

## 2020-07-10 PROCEDURE — 76642 ULTRASOUND BREAST LIMITED: CPT | Performed by: SURGERY

## 2020-07-12 DIAGNOSIS — F41.1 GAD (GENERALIZED ANXIETY DISORDER): ICD-10-CM

## 2020-07-13 RX ORDER — CLONAZEPAM 0.5 MG/1
0.5 TABLET ORAL NIGHTLY PRN
Qty: 30 TABLET | Refills: 0 | OUTPATIENT
Start: 2020-07-13

## 2020-07-13 NOTE — TELEPHONE ENCOUNTER
KMS:8/68/5809  LF:4/7/2020    Please call and schedule OV. Patient has not been seen in almost a year. Medication denied at this time.

## 2020-08-10 ENCOUNTER — TELEPHONE (OUTPATIENT)
Dept: FAMILY MEDICINE CLINIC | Facility: CLINIC | Age: 45
End: 2020-08-10

## 2020-08-10 NOTE — TELEPHONE ENCOUNTER
Pt feels that FLUoxetine HCl 40 MG Oral Cap is no longer working for her and might be interfering with Pt's weight loss. Pt would like to stop taking the medication but is not sure if she should be weaned off or just stop it altogether.  Pt does not want a

## 2020-08-10 NOTE — TELEPHONE ENCOUNTER
Patient is requesting to stopped her Fluoxetine. Patient currently takes 40mg daily. Please advise if patient should taper off and if so how. Thank you!

## 2020-08-11 RX ORDER — FLUOXETINE HYDROCHLORIDE 20 MG/1
CAPSULE ORAL
Qty: 20 CAPSULE | Refills: 0 | Status: SHIPPED | OUTPATIENT
Start: 2020-08-11 | End: 2020-10-27 | Stop reason: CLARIF

## 2020-08-11 NOTE — TELEPHONE ENCOUNTER
Ok, to drop to 20 mg q daily for 2 weeks,   20 mg every other day on week three. 20 mg every third day on week four, then she can stop.

## 2020-08-11 NOTE — TELEPHONE ENCOUNTER
Called patient and spoke with her. Advised her of POC below. Patient states understanding. Patient requesting the 20mg capsule Rx be sent to the pharmacy. Rx sent to pharmacy as requested.

## 2020-08-21 DIAGNOSIS — F41.1 GAD (GENERALIZED ANXIETY DISORDER): ICD-10-CM

## 2020-08-21 RX ORDER — FLUOXETINE HYDROCHLORIDE 40 MG/1
CAPSULE ORAL
Qty: 90 CAPSULE | Refills: 1 | Status: SHIPPED | OUTPATIENT
Start: 2020-08-21 | End: 2020-10-27 | Stop reason: CLARIF

## 2020-08-21 NOTE — TELEPHONE ENCOUNTER
Medication(s) to Refill:   Requested Prescriptions     Pending Prescriptions Disp Refills   • FLUOXETINE HCL 40 MG Oral Cap [Pharmacy Med Name: FLUOXETINE HCL 40 MG CAPSULE] 90 capsule 1     Sig: TAKE 1 CAPSULE BY MOUTH EVERY DAY         Reason for Medicat

## 2020-09-22 PROCEDURE — 87088 URINE BACTERIA CULTURE: CPT | Performed by: NURSE PRACTITIONER

## 2020-09-22 PROCEDURE — 87186 SC STD MICRODIL/AGAR DIL: CPT | Performed by: NURSE PRACTITIONER

## 2020-09-22 PROCEDURE — 87086 URINE CULTURE/COLONY COUNT: CPT | Performed by: NURSE PRACTITIONER

## 2020-10-05 ENCOUNTER — VIRTUAL PHONE E/M (OUTPATIENT)
Dept: FAMILY MEDICINE CLINIC | Facility: CLINIC | Age: 45
End: 2020-10-05
Payer: COMMERCIAL

## 2020-10-05 DIAGNOSIS — J32.9 VIRAL SINUSITIS: Primary | ICD-10-CM

## 2020-10-05 DIAGNOSIS — B97.89 VIRAL SINUSITIS: Primary | ICD-10-CM

## 2020-10-05 PROCEDURE — 99213 OFFICE O/P EST LOW 20 MIN: CPT | Performed by: FAMILY MEDICINE

## 2020-10-05 RX ORDER — CETIRIZINE HYDROCHLORIDE 10 MG/1
1 CAPSULE, LIQUID FILLED ORAL DAILY
Qty: 30 CAPSULE | Refills: 0 | Status: SHIPPED | OUTPATIENT
Start: 2020-10-05 | End: 2021-05-12 | Stop reason: ALTCHOICE

## 2020-10-05 RX ORDER — FLUTICASONE PROPIONATE 50 MCG
2 SPRAY, SUSPENSION (ML) NASAL DAILY
Qty: 1 BOTTLE | Refills: 0 | Status: SHIPPED | OUTPATIENT
Start: 2020-10-05 | End: 2021-05-12 | Stop reason: ALTCHOICE

## 2020-10-05 RX ORDER — PSEUDOEPHEDRINE HCL 120 MG/1
120 TABLET, FILM COATED, EXTENDED RELEASE ORAL EVERY 12 HOURS
Qty: 14 TABLET | Refills: 0 | Status: SHIPPED | OUTPATIENT
Start: 2020-10-05 | End: 2021-05-12 | Stop reason: ALTCHOICE

## 2020-10-05 RX ORDER — ECHINACEA PURPUREA EXTRACT 125 MG
2 TABLET ORAL AS NEEDED
Qty: 1 BOTTLE | Refills: 0 | Status: SHIPPED | OUTPATIENT
Start: 2020-10-05 | End: 2021-05-12 | Stop reason: ALTCHOICE

## 2020-10-05 NOTE — PROGRESS NOTES
Virtual Telephone Check-In    Юлия Shine verbally consents to a Virtual/Telephone Check-In visit on 10/05/20. Patient has been referred to the [facility] website at [email or web address] to review the yearly Consent to Treat document.     Patient un

## 2020-10-27 PROCEDURE — 87086 URINE CULTURE/COLONY COUNT: CPT | Performed by: NURSE PRACTITIONER

## 2021-05-12 ENCOUNTER — OFFICE VISIT (OUTPATIENT)
Dept: FAMILY MEDICINE CLINIC | Facility: CLINIC | Age: 46
End: 2021-05-12
Payer: COMMERCIAL

## 2021-05-12 VITALS
TEMPERATURE: 98 F | WEIGHT: 142 LBS | HEIGHT: 65.5 IN | SYSTOLIC BLOOD PRESSURE: 120 MMHG | BODY MASS INDEX: 23.37 KG/M2 | DIASTOLIC BLOOD PRESSURE: 84 MMHG | HEART RATE: 79 BPM | OXYGEN SATURATION: 98 % | RESPIRATION RATE: 16 BRPM

## 2021-05-12 DIAGNOSIS — Z00.00 ANNUAL PHYSICAL EXAM: Primary | ICD-10-CM

## 2021-05-12 DIAGNOSIS — Z12.31 ENCOUNTER FOR SCREENING MAMMOGRAM FOR MALIGNANT NEOPLASM OF BREAST: ICD-10-CM

## 2021-05-12 DIAGNOSIS — J30.2 SEASONAL ALLERGIC RHINITIS, UNSPECIFIED TRIGGER: ICD-10-CM

## 2021-05-12 DIAGNOSIS — F41.1 GAD (GENERALIZED ANXIETY DISORDER): ICD-10-CM

## 2021-05-12 DIAGNOSIS — Z28.21 REFUSES TETANUS, DIPHTHERIA, AND ACELLULAR PERTUSSIS (TDAP) VACCINATION: ICD-10-CM

## 2021-05-12 DIAGNOSIS — Z00.00 LABORATORY EXAM ORDERED AS PART OF ROUTINE GENERAL MEDICAL EXAMINATION: ICD-10-CM

## 2021-05-12 PROCEDURE — 3008F BODY MASS INDEX DOCD: CPT | Performed by: NURSE PRACTITIONER

## 2021-05-12 PROCEDURE — 99396 PREV VISIT EST AGE 40-64: CPT | Performed by: NURSE PRACTITIONER

## 2021-05-12 PROCEDURE — 3079F DIAST BP 80-89 MM HG: CPT | Performed by: NURSE PRACTITIONER

## 2021-05-12 PROCEDURE — 3074F SYST BP LT 130 MM HG: CPT | Performed by: NURSE PRACTITIONER

## 2021-05-12 RX ORDER — ESCITALOPRAM OXALATE 5 MG/1
5 TABLET ORAL DAILY
Refills: 0 | COMMUNITY
Start: 2021-05-12 | End: 2021-07-01

## 2021-05-12 RX ORDER — LEVOCETIRIZINE DIHYDROCHLORIDE 5 MG/1
5 TABLET, FILM COATED ORAL EVERY EVENING
Qty: 90 TABLET | Refills: 0 | Status: SHIPPED | OUTPATIENT
Start: 2021-05-12 | End: 2021-08-02

## 2021-05-12 RX ORDER — HYDROXYZINE HYDROCHLORIDE 10 MG/1
TABLET, FILM COATED ORAL
COMMUNITY
Start: 2021-05-11 | End: 2021-10-25

## 2021-05-12 RX ORDER — MAGNESIUM OXIDE/MAG AA CHELATE 300 MG
200 CAPSULE ORAL
COMMUNITY

## 2021-05-12 NOTE — PATIENT INSTRUCTIONS
Prevention Guidelines, Women Ages 36 to 52  Screening tests and vaccines are an important part of managing your health. A screening test is done to find diseases in people who don't have any symptoms.  The goal is to find a disease early so lifestyle milan sigmoidoscopy every 5 years, or  · Colonoscopy every 10 years, or  · CT colonography (virtual colonoscopy) every 5 years, or  · Yearly fecal occult blood test, or  · Yearly fecal immunochemical test every year, or  · Stool DNA test, every 3 years  If you c least 4 weeks after the first dose   Hepatitis A Women at increased risk for infection–talk with your healthcare provider 2 doses given 6 months apart   Hepatitis B Women at increased risk for infection–talk with your healthcare provider 3 doses over 6 mon American Academy of Ophthalmology  Mike last reviewed this educational content on 11/1/2017  © 2364-0619 The [employer]. All rights reserved. This information is not intended as a substitute for professional medical care.  Always follow your

## 2021-05-12 NOTE — PROGRESS NOTES
Charlotte Garland is a 48year old female who presents for a complete physical exam.     PMH + for JOEL, depression, and seasonal allergies. JOEL/depression   Worsening over the past few weeks. Has anxiety over health.  She sees psychiatrist and counse BIOPSY RIGHT Right 04/2019    benign fibroadenoma, dense stroma fibrosis      Family History   Problem Relation Age of Onset   • Other (Other) Father 52        Alcoholism   • Cancer Maternal Grandmother 79        Breast Ca   • No Known Problems Brother CHEST: no chest tenderness  BREAST: Deferred to gyne per patient request  LUNGS: clear to auscultation  CARDIO: RRR without murmur  GI: good BS's,no masses, HSM or tenderness  : Deferred to gyne   MUSCULOSKELETAL: back is not tender, FROM of the back a

## 2021-05-13 ENCOUNTER — LAB ENCOUNTER (OUTPATIENT)
Dept: LAB | Age: 46
End: 2021-05-13
Attending: NURSE PRACTITIONER
Payer: COMMERCIAL

## 2021-05-13 DIAGNOSIS — Z00.00 LABORATORY EXAM ORDERED AS PART OF ROUTINE GENERAL MEDICAL EXAMINATION: ICD-10-CM

## 2021-05-13 PROCEDURE — 80061 LIPID PANEL: CPT | Performed by: NURSE PRACTITIONER

## 2021-05-13 PROCEDURE — 80050 GENERAL HEALTH PANEL: CPT | Performed by: NURSE PRACTITIONER

## 2021-05-21 NOTE — TELEPHONE ENCOUNTER
Patient was seen by Kaila Rasheed last week and forgot to ask for restarting the CLONAZEPAM 0.5 MG Oral Tab.

## 2021-05-21 NOTE — TELEPHONE ENCOUNTER
Saw Chapis Heller on 5/12/21. Chiara Kyle to restart the clonazepam? Last fill 4/7/20.  Script pended

## 2021-05-24 RX ORDER — CLONAZEPAM 0.5 MG/1
0.5 TABLET ORAL NIGHTLY PRN
Qty: 30 TABLET | Refills: 0 | Status: SHIPPED | OUTPATIENT
Start: 2021-05-24 | End: 2021-06-21

## 2021-05-24 NOTE — TELEPHONE ENCOUNTER
She has been seeing a psychiatrist whose managing her medications and recommended she start Lexapro. She wasn't sure if she was going to start it or not.  I'm ok with her using clonazepam as needed, but would recommend she start the Lexapro and also discuss

## 2021-05-24 NOTE — TELEPHONE ENCOUNTER
yelitza sent to her with instructions/recommendations. Script pended for approval at this time if ok?

## 2021-06-22 RX ORDER — CLONAZEPAM 0.5 MG/1
0.5 TABLET ORAL NIGHTLY PRN
Qty: 30 TABLET | Refills: 0 | OUTPATIENT
Start: 2021-06-22

## 2021-06-22 RX ORDER — CLONAZEPAM 0.5 MG/1
0.5 TABLET ORAL NIGHTLY PRN
Qty: 30 TABLET | Refills: 0 | Status: SHIPPED | OUTPATIENT
Start: 2021-06-22 | End: 2021-07-20

## 2021-07-01 PROCEDURE — 87077 CULTURE AEROBIC IDENTIFY: CPT | Performed by: NURSE PRACTITIONER

## 2021-07-01 PROCEDURE — 87186 SC STD MICRODIL/AGAR DIL: CPT | Performed by: NURSE PRACTITIONER

## 2021-07-01 PROCEDURE — 87086 URINE CULTURE/COLONY COUNT: CPT | Performed by: NURSE PRACTITIONER

## 2021-07-20 RX ORDER — CLONAZEPAM 0.5 MG/1
0.5 TABLET ORAL NIGHTLY PRN
Qty: 30 TABLET | Refills: 0 | Status: SHIPPED | OUTPATIENT
Start: 2021-07-20 | End: 2021-08-19

## 2021-08-01 DIAGNOSIS — J30.2 SEASONAL ALLERGIC RHINITIS, UNSPECIFIED TRIGGER: ICD-10-CM

## 2021-08-02 RX ORDER — LEVOCETIRIZINE DIHYDROCHLORIDE 5 MG/1
TABLET, FILM COATED ORAL
Qty: 90 TABLET | Refills: 0 | Status: SHIPPED | OUTPATIENT
Start: 2021-08-02 | End: 2022-01-05 | Stop reason: ALTCHOICE

## 2021-08-19 RX ORDER — CLONAZEPAM 0.5 MG/1
0.5 TABLET ORAL NIGHTLY PRN
Qty: 30 TABLET | Refills: 0 | Status: SHIPPED | OUTPATIENT
Start: 2021-08-19 | End: 2021-09-20

## 2021-09-03 ENCOUNTER — TELEMEDICINE (OUTPATIENT)
Dept: FAMILY MEDICINE CLINIC | Facility: CLINIC | Age: 46
End: 2021-09-03
Payer: COMMERCIAL

## 2021-09-03 DIAGNOSIS — J01.00 ACUTE NON-RECURRENT MAXILLARY SINUSITIS: Primary | ICD-10-CM

## 2021-09-03 PROCEDURE — 99214 OFFICE O/P EST MOD 30 MIN: CPT | Performed by: FAMILY MEDICINE

## 2021-09-03 RX ORDER — AZITHROMYCIN 250 MG/1
TABLET, FILM COATED ORAL
Qty: 6 TABLET | Refills: 0 | Status: SHIPPED | OUTPATIENT
Start: 2021-09-03 | End: 2021-09-08

## 2021-09-03 NOTE — PROGRESS NOTES
Virtual Telephone Check-In    Юлия Shine verbally consents to a Virtual/Telephone Check-In visit on 09/03/21. Patient has been referred to the [facility] website at [email or web address] to review the yearly Consent to Treat document.     Patient un

## 2021-09-20 RX ORDER — CLONAZEPAM 0.5 MG/1
0.5 TABLET ORAL NIGHTLY PRN
Qty: 30 TABLET | Refills: 0 | Status: SHIPPED | OUTPATIENT
Start: 2021-09-20 | End: 2021-10-20

## 2021-10-20 RX ORDER — CLONAZEPAM 0.5 MG/1
0.5 TABLET ORAL NIGHTLY PRN
Qty: 30 TABLET | Refills: 0 | Status: SHIPPED | OUTPATIENT
Start: 2021-10-20 | End: 2021-11-18

## 2021-10-23 ENCOUNTER — HOSPITAL ENCOUNTER (OUTPATIENT)
Dept: MAMMOGRAPHY | Age: 46
Discharge: HOME | End: 2021-10-23
Attending: NURSE PRACTITIONER
Payer: COMMERCIAL

## 2021-10-23 DIAGNOSIS — Z12.31 ENCOUNTER FOR SCREENING MAMMOGRAM FOR MALIGNANT NEOPLASM OF BREAST: ICD-10-CM

## 2021-10-23 PROCEDURE — 77067 SCR MAMMO BI INCL CAD: CPT | Performed by: NURSE PRACTITIONER

## 2021-10-23 PROCEDURE — 77063 BREAST TOMOSYNTHESIS BI: CPT | Performed by: NURSE PRACTITIONER

## 2021-10-25 ENCOUNTER — OFFICE VISIT (OUTPATIENT)
Dept: UROLOGY | Facility: HOSPITAL | Age: 46
End: 2021-10-25
Attending: OBSTETRICS & GYNECOLOGY
Payer: COMMERCIAL

## 2021-10-25 VITALS — WEIGHT: 132 LBS | HEIGHT: 65.5 IN | TEMPERATURE: 98 F | BODY MASS INDEX: 21.73 KG/M2

## 2021-10-25 DIAGNOSIS — N81.11 CYSTOCELE, MIDLINE: Primary | ICD-10-CM

## 2021-10-25 DIAGNOSIS — N81.6 RECTOCELE: ICD-10-CM

## 2021-10-25 PROCEDURE — 99212 OFFICE O/P EST SF 10 MIN: CPT

## 2021-11-11 ENCOUNTER — OFFICE VISIT (OUTPATIENT)
Dept: UROLOGY | Facility: HOSPITAL | Age: 46
End: 2021-11-11
Attending: OBSTETRICS & GYNECOLOGY
Payer: COMMERCIAL

## 2021-11-11 VITALS — RESPIRATION RATE: 16 BRPM | BODY MASS INDEX: 21.73 KG/M2 | WEIGHT: 132 LBS | TEMPERATURE: 97 F | HEIGHT: 65.5 IN

## 2021-11-11 DIAGNOSIS — N81.11 CYSTOCELE, MIDLINE: Primary | ICD-10-CM

## 2021-11-11 PROCEDURE — 51741 ELECTRO-UROFLOWMETRY FIRST: CPT

## 2021-11-11 PROCEDURE — 51784 ANAL/URINARY MUSCLE STUDY: CPT

## 2021-11-11 PROCEDURE — 51729 CYSTOMETROGRAM W/VP&UP: CPT

## 2021-11-11 PROCEDURE — 51797 INTRAABDOMINAL PRESSURE TEST: CPT

## 2021-11-11 PROCEDURE — 81002 URINALYSIS NONAUTO W/O SCOPE: CPT

## 2021-11-11 NOTE — PATIENT INSTRUCTIONS
[address]  [facility] [address]  [facility]: [phone]  FAX: [phone]       Urodynamic Testing Discharge Instructions: There are NO dietary or activity restrictions.   You may resume abdominal incision. · You may ride in a car immediately. · You may drive after 1-2 weeks.     Please call us for any of the following:  · Temperature above 100.5 for 4 hours or above 101.0 at any time  · Chest pain or trouble breathing  · Vaginal bleeding you go to bed and if you wake up in the night, you do not need to set an alarm to wake up. · Drink plenty of water (6-8 glasses) slowly throughout the day. · Avoid liquids containing caffeine.   · Continue with any pain medications (Motrin) as directed by

## 2021-11-11 NOTE — PROCEDURES
Patient here for urodynamic testing. Procedure explained and confirmed by patient. See evaluation form for results. Both verbal and written discharge instructions were given.   Patient tolerated procedure well and will follow up with Dr. Juani Cornell on inserted with good reduction  after Uroflow to better assess incomplete emptying     UROFLOWMETRY:  Voided Volume:                      497       mL  Maximum Flow Rate:                          19      mL/sec  Average flow rate:                        10 mL  Voiding mechanism:  [x]  Abnormal  []  Normal  [x]  Strain to void   []  Weak detrusor  Void:   []  Typical   [x]  Atypical    Additional Notes: Pessary initially in place for flow study ( good reduction)  - patient voided 15 ml - felt that pessar

## 2021-11-15 ENCOUNTER — OFFICE VISIT (OUTPATIENT)
Dept: UROLOGY | Facility: HOSPITAL | Age: 46
End: 2021-11-15
Attending: OBSTETRICS & GYNECOLOGY
Payer: COMMERCIAL

## 2021-11-15 VITALS — BODY MASS INDEX: 21.73 KG/M2 | TEMPERATURE: 98 F | HEIGHT: 65.5 IN | WEIGHT: 132 LBS

## 2021-11-15 DIAGNOSIS — N81.2 UTEROVAGINAL PROLAPSE, INCOMPLETE: Primary | ICD-10-CM

## 2021-11-15 DIAGNOSIS — N81.11 CYSTOCELE, MIDLINE: ICD-10-CM

## 2021-11-15 DIAGNOSIS — R33.9 INCOMPLETE EMPTYING OF BLADDER: ICD-10-CM

## 2021-11-15 DIAGNOSIS — N81.6 RECTOCELE: ICD-10-CM

## 2021-11-15 PROCEDURE — 99212 OFFICE O/P EST SF 10 MIN: CPT

## 2021-11-15 RX ORDER — ALPRAZOLAM 0.25 MG/1
0.25 TABLET ORAL 2 TIMES DAILY PRN
Qty: 20 TABLET | Refills: 0 | Status: SHIPPED | OUTPATIENT
Start: 2021-11-15 | End: 2022-01-05 | Stop reason: ALTCHOICE

## 2021-11-18 RX ORDER — CLONAZEPAM 0.5 MG/1
0.5 TABLET ORAL NIGHTLY PRN
Qty: 30 TABLET | Refills: 2 | Status: SHIPPED | OUTPATIENT
Start: 2021-11-18 | End: 2021-12-20

## 2021-12-20 RX ORDER — CLONAZEPAM 0.5 MG/1
0.5 TABLET ORAL NIGHTLY PRN
Qty: 30 TABLET | Refills: 2 | Status: SHIPPED | OUTPATIENT
Start: 2021-12-20 | End: 2022-01-25

## 2022-01-04 PROBLEM — Z01.812 ENCOUNTER FOR PREOPERATIVE SCREENING LABORATORY TESTING FOR COVID-19 VIRUS: Status: ACTIVE | Noted: 2022-01-04

## 2022-01-04 PROBLEM — Z11.52 ENCOUNTER FOR PREOPERATIVE SCREENING LABORATORY TESTING FOR COVID-19 VIRUS: Status: ACTIVE | Noted: 2022-01-04

## 2022-01-04 PROBLEM — Z20.822 ENCOUNTER FOR PREOPERATIVE SCREENING LABORATORY TESTING FOR COVID-19 VIRUS: Status: ACTIVE | Noted: 2022-01-04

## 2022-01-11 ENCOUNTER — LAB ENCOUNTER (OUTPATIENT)
Dept: LAB | Age: 47
End: 2022-01-11
Attending: OBSTETRICS & GYNECOLOGY
Payer: COMMERCIAL

## 2022-01-11 DIAGNOSIS — Z01.812 ENCOUNTER FOR PREOPERATIVE SCREENING LABORATORY TESTING FOR COVID-19 VIRUS: ICD-10-CM

## 2022-01-11 DIAGNOSIS — N81.4 UTEROVAGINAL PROLAPSE: ICD-10-CM

## 2022-01-11 DIAGNOSIS — Z20.822 ENCOUNTER FOR PREOPERATIVE SCREENING LABORATORY TESTING FOR COVID-19 VIRUS: ICD-10-CM

## 2022-01-11 LAB
ERYTHROCYTE [DISTWIDTH] IN BLOOD BY AUTOMATED COUNT: 13.9 %
HCT VFR BLD AUTO: 39.3 %
HGB BLD-MCNC: 12.2 G/DL
MCH RBC QN AUTO: 27.5 PG (ref 26–34)
MCHC RBC AUTO-ENTMCNC: 31 G/DL (ref 31–37)
MCV RBC AUTO: 88.5 FL
PLATELET # BLD AUTO: 367 10(3)UL (ref 150–450)
RBC # BLD AUTO: 4.44 X10(6)UL
WBC # BLD AUTO: 6.3 X10(3) UL (ref 4–11)

## 2022-01-11 PROCEDURE — 85027 COMPLETE CBC AUTOMATED: CPT

## 2022-01-11 PROCEDURE — 36415 COLL VENOUS BLD VENIPUNCTURE: CPT

## 2022-01-12 LAB — SARS-COV-2 RNA RESP QL NAA+PROBE: NOT DETECTED

## 2022-01-13 ENCOUNTER — HOSPITAL ENCOUNTER (OUTPATIENT)
Facility: HOSPITAL | Age: 47
Setting detail: HOSPITAL OUTPATIENT SURGERY
Discharge: HOME | End: 2022-01-13
Attending: OBSTETRICS & GYNECOLOGY | Admitting: OBSTETRICS & GYNECOLOGY
Payer: COMMERCIAL

## 2022-01-13 ENCOUNTER — ANESTHESIA (OUTPATIENT)
Dept: SURGERY | Facility: HOSPITAL | Age: 47
End: 2022-01-13
Payer: COMMERCIAL

## 2022-01-13 ENCOUNTER — ANESTHESIA EVENT (OUTPATIENT)
Dept: SURGERY | Facility: HOSPITAL | Age: 47
End: 2022-01-13
Payer: COMMERCIAL

## 2022-01-13 VITALS
SYSTOLIC BLOOD PRESSURE: 116 MMHG | BODY MASS INDEX: 22.14 KG/M2 | HEIGHT: 65.5 IN | DIASTOLIC BLOOD PRESSURE: 62 MMHG | OXYGEN SATURATION: 98 % | WEIGHT: 134.5 LBS | RESPIRATION RATE: 18 BRPM | HEART RATE: 82 BPM | TEMPERATURE: 100 F

## 2022-01-13 DIAGNOSIS — Z01.812 ENCOUNTER FOR PREOPERATIVE SCREENING LABORATORY TESTING FOR COVID-19 VIRUS: Primary | ICD-10-CM

## 2022-01-13 DIAGNOSIS — Z20.822 ENCOUNTER FOR PREOPERATIVE SCREENING LABORATORY TESTING FOR COVID-19 VIRUS: Primary | ICD-10-CM

## 2022-01-13 DIAGNOSIS — N81.4 UTEROVAGINAL PROLAPSE: ICD-10-CM

## 2022-01-13 LAB — B-HCG UR QL: NEGATIVE

## 2022-01-13 PROCEDURE — 0USG7ZZ REPOSITION VAGINA, VIA NATURAL OR ARTIFICIAL OPENING: ICD-10-PCS | Performed by: OBSTETRICS & GYNECOLOGY

## 2022-01-13 PROCEDURE — 0UT97ZZ RESECTION OF UTERUS, VIA NATURAL OR ARTIFICIAL OPENING: ICD-10-PCS | Performed by: OBSTETRICS & GYNECOLOGY

## 2022-01-13 PROCEDURE — 0JQC0ZZ REPAIR PELVIC REGION SUBCUTANEOUS TISSUE AND FASCIA, OPEN APPROACH: ICD-10-PCS | Performed by: OBSTETRICS & GYNECOLOGY

## 2022-01-13 PROCEDURE — 0TJB8ZZ INSPECTION OF BLADDER, VIA NATURAL OR ARTIFICIAL OPENING ENDOSCOPIC: ICD-10-PCS | Performed by: OBSTETRICS & GYNECOLOGY

## 2022-01-13 PROCEDURE — 0UT77ZZ RESECTION OF BILATERAL FALLOPIAN TUBES, VIA NATURAL OR ARTIFICIAL OPENING: ICD-10-PCS | Performed by: OBSTETRICS & GYNECOLOGY

## 2022-01-13 PROCEDURE — 0UQF0ZZ REPAIR CUL-DE-SAC, OPEN APPROACH: ICD-10-PCS | Performed by: OBSTETRICS & GYNECOLOGY

## 2022-01-13 PROCEDURE — 88305 TISSUE EXAM BY PATHOLOGIST: CPT | Performed by: OBSTETRICS & GYNECOLOGY

## 2022-01-13 PROCEDURE — 81025 URINE PREGNANCY TEST: CPT

## 2022-01-13 RX ORDER — IBUPROFEN 600 MG/1
600 TABLET ORAL EVERY 6 HOURS
Qty: 40 TABLET | Refills: 0 | Status: SHIPPED | OUTPATIENT
Start: 2022-01-13

## 2022-01-13 RX ORDER — NEOSTIGMINE METHYLSULFATE 1 MG/ML
INJECTION INTRAVENOUS AS NEEDED
Status: DISCONTINUED | OUTPATIENT
Start: 2022-01-13 | End: 2022-01-13 | Stop reason: SURG

## 2022-01-13 RX ORDER — ACETAMINOPHEN 500 MG
1000 TABLET ORAL ONCE
Status: DISCONTINUED | OUTPATIENT
Start: 2022-01-13 | End: 2022-01-13 | Stop reason: HOSPADM

## 2022-01-13 RX ORDER — HYDROCODONE BITARTRATE AND ACETAMINOPHEN 5; 325 MG/1; MG/1
2 TABLET ORAL AS NEEDED
Status: COMPLETED | OUTPATIENT
Start: 2022-01-13 | End: 2022-01-13

## 2022-01-13 RX ORDER — GLYCOPYRROLATE 0.2 MG/ML
INJECTION, SOLUTION INTRAMUSCULAR; INTRAVENOUS AS NEEDED
Status: DISCONTINUED | OUTPATIENT
Start: 2022-01-13 | End: 2022-01-13 | Stop reason: SURG

## 2022-01-13 RX ORDER — ONDANSETRON 2 MG/ML
4 INJECTION INTRAMUSCULAR; INTRAVENOUS AS NEEDED
Status: DISCONTINUED | OUTPATIENT
Start: 2022-01-13 | End: 2022-01-13

## 2022-01-13 RX ORDER — EPHEDRINE SULFATE 50 MG/ML
INJECTION INTRAVENOUS AS NEEDED
Status: DISCONTINUED | OUTPATIENT
Start: 2022-01-13 | End: 2022-01-13 | Stop reason: SURG

## 2022-01-13 RX ORDER — ONDANSETRON 4 MG/1
4 TABLET, FILM COATED ORAL EVERY 8 HOURS PRN
Qty: 12 TABLET | Refills: 1 | Status: SHIPPED | OUTPATIENT
Start: 2022-01-13

## 2022-01-13 RX ORDER — MIDAZOLAM HYDROCHLORIDE 1 MG/ML
INJECTION INTRAMUSCULAR; INTRAVENOUS AS NEEDED
Status: DISCONTINUED | OUTPATIENT
Start: 2022-01-13 | End: 2022-01-13 | Stop reason: SURG

## 2022-01-13 RX ORDER — CEFAZOLIN SODIUM/WATER 2 G/20 ML
SYRINGE (ML) INTRAVENOUS
Status: DISCONTINUED
Start: 2022-01-13 | End: 2022-01-13

## 2022-01-13 RX ORDER — BUPIVACAINE HYDROCHLORIDE AND EPINEPHRINE 2.5; 5 MG/ML; UG/ML
INJECTION, SOLUTION EPIDURAL; INFILTRATION; INTRACAUDAL; PERINEURAL AS NEEDED
Status: DISCONTINUED | OUTPATIENT
Start: 2022-01-13 | End: 2022-01-13 | Stop reason: HOSPADM

## 2022-01-13 RX ORDER — SODIUM CHLORIDE, SODIUM LACTATE, POTASSIUM CHLORIDE, CALCIUM CHLORIDE 600; 310; 30; 20 MG/100ML; MG/100ML; MG/100ML; MG/100ML
INJECTION, SOLUTION INTRAVENOUS CONTINUOUS
Status: DISCONTINUED | OUTPATIENT
Start: 2022-01-13 | End: 2022-01-13

## 2022-01-13 RX ORDER — ONDANSETRON 2 MG/ML
INJECTION INTRAMUSCULAR; INTRAVENOUS AS NEEDED
Status: DISCONTINUED | OUTPATIENT
Start: 2022-01-13 | End: 2022-01-13 | Stop reason: SURG

## 2022-01-13 RX ORDER — LIDOCAINE HYDROCHLORIDE 10 MG/ML
INJECTION, SOLUTION EPIDURAL; INFILTRATION; INTRACAUDAL; PERINEURAL AS NEEDED
Status: DISCONTINUED | OUTPATIENT
Start: 2022-01-13 | End: 2022-01-13 | Stop reason: SURG

## 2022-01-13 RX ORDER — CEFAZOLIN SODIUM/WATER 2 G/20 ML
2 SYRINGE (ML) INTRAVENOUS ONCE
Status: COMPLETED | OUTPATIENT
Start: 2022-01-13 | End: 2022-01-13

## 2022-01-13 RX ORDER — HYDROCODONE BITARTRATE AND ACETAMINOPHEN 5; 325 MG/1; MG/1
1 TABLET ORAL EVERY 6 HOURS PRN
Qty: 20 TABLET | Refills: 0 | Status: SHIPPED | OUTPATIENT
Start: 2022-01-13

## 2022-01-13 RX ORDER — HYDROCODONE BITARTRATE AND ACETAMINOPHEN 5; 325 MG/1; MG/1
1 TABLET ORAL AS NEEDED
Status: COMPLETED | OUTPATIENT
Start: 2022-01-13 | End: 2022-01-13

## 2022-01-13 RX ORDER — HYDROMORPHONE HYDROCHLORIDE 1 MG/ML
INJECTION, SOLUTION INTRAMUSCULAR; INTRAVENOUS; SUBCUTANEOUS
Status: COMPLETED
Start: 2022-01-13 | End: 2022-01-13

## 2022-01-13 RX ORDER — DEXAMETHASONE SODIUM PHOSPHATE 4 MG/ML
VIAL (ML) INJECTION AS NEEDED
Status: DISCONTINUED | OUTPATIENT
Start: 2022-01-13 | End: 2022-01-13 | Stop reason: SURG

## 2022-01-13 RX ORDER — DIPHENHYDRAMINE HYDROCHLORIDE 50 MG/ML
INJECTION INTRAMUSCULAR; INTRAVENOUS AS NEEDED
Status: DISCONTINUED | OUTPATIENT
Start: 2022-01-13 | End: 2022-01-13 | Stop reason: SURG

## 2022-01-13 RX ORDER — HYDROCODONE BITARTRATE AND ACETAMINOPHEN 5; 325 MG/1; MG/1
TABLET ORAL
Status: COMPLETED
Start: 2022-01-13 | End: 2022-01-13

## 2022-01-13 RX ORDER — NALOXONE HYDROCHLORIDE 0.4 MG/ML
80 INJECTION, SOLUTION INTRAMUSCULAR; INTRAVENOUS; SUBCUTANEOUS AS NEEDED
Status: DISCONTINUED | OUTPATIENT
Start: 2022-01-13 | End: 2022-01-13

## 2022-01-13 RX ORDER — KETOROLAC TROMETHAMINE 30 MG/ML
INJECTION, SOLUTION INTRAMUSCULAR; INTRAVENOUS AS NEEDED
Status: DISCONTINUED | OUTPATIENT
Start: 2022-01-13 | End: 2022-01-13 | Stop reason: SURG

## 2022-01-13 RX ORDER — ROCURONIUM BROMIDE 10 MG/ML
INJECTION, SOLUTION INTRAVENOUS AS NEEDED
Status: DISCONTINUED | OUTPATIENT
Start: 2022-01-13 | End: 2022-01-13 | Stop reason: SURG

## 2022-01-13 RX ORDER — TRAMADOL HYDROCHLORIDE 50 MG/1
50 TABLET ORAL EVERY 6 HOURS PRN
Qty: 28 TABLET | Refills: 0 | Status: SHIPPED | OUTPATIENT
Start: 2022-01-13

## 2022-01-13 RX ORDER — HYDROMORPHONE HYDROCHLORIDE 1 MG/ML
0.4 INJECTION, SOLUTION INTRAMUSCULAR; INTRAVENOUS; SUBCUTANEOUS EVERY 5 MIN PRN
Status: DISCONTINUED | OUTPATIENT
Start: 2022-01-13 | End: 2022-01-13

## 2022-01-13 RX ADMIN — DEXAMETHASONE SODIUM PHOSPHATE 8 MG: 4 MG/ML VIAL (ML) INJECTION at 11:31:00

## 2022-01-13 RX ADMIN — MIDAZOLAM HYDROCHLORIDE 2 MG: 1 INJECTION INTRAMUSCULAR; INTRAVENOUS at 11:21:00

## 2022-01-13 RX ADMIN — ONDANSETRON 4 MG: 2 INJECTION INTRAMUSCULAR; INTRAVENOUS at 12:51:00

## 2022-01-13 RX ADMIN — DIPHENHYDRAMINE HYDROCHLORIDE 12.5 MG: 50 INJECTION INTRAMUSCULAR; INTRAVENOUS at 11:32:00

## 2022-01-13 RX ADMIN — CEFAZOLIN SODIUM/WATER 2 G: 2 G/20 ML SYRINGE (ML) INTRAVENOUS at 11:33:00

## 2022-01-13 RX ADMIN — NEOSTIGMINE METHYLSULFATE 5 MG: 1 INJECTION INTRAVENOUS at 13:15:00

## 2022-01-13 RX ADMIN — SODIUM CHLORIDE, SODIUM LACTATE, POTASSIUM CHLORIDE, CALCIUM CHLORIDE: 600; 310; 30; 20 INJECTION, SOLUTION INTRAVENOUS at 11:22:00

## 2022-01-13 RX ADMIN — GLYCOPYRROLATE 0.8 MG: 0.2 INJECTION, SOLUTION INTRAMUSCULAR; INTRAVENOUS at 13:15:00

## 2022-01-13 RX ADMIN — EPHEDRINE SULFATE 10 MG: 50 INJECTION INTRAVENOUS at 12:06:00

## 2022-01-13 RX ADMIN — LIDOCAINE HYDROCHLORIDE 50 MG: 10 INJECTION, SOLUTION EPIDURAL; INFILTRATION; INTRACAUDAL; PERINEURAL at 11:25:00

## 2022-01-13 RX ADMIN — KETOROLAC TROMETHAMINE 30 MG: 30 INJECTION, SOLUTION INTRAMUSCULAR; INTRAVENOUS at 12:51:00

## 2022-01-13 RX ADMIN — ROCURONIUM BROMIDE 50 MG: 10 INJECTION, SOLUTION INTRAVENOUS at 11:26:00

## 2022-01-13 NOTE — H&P
[address] Patient Status:  [facility] Outpatient Surgery    1975 MRN [id number]   Location [facility] Attending Joy Borja MD   Hosp Day # 0 PCP Alessia Cool MD     [address] [facility] oz M NORMAL SPONT   JOLYNN   2 Term 10/16/09 40w0d  9 lb 7 oz M NORMAL SPONT   JOLYNN   1 SAB                Past GYN History:   Menarche: 11 (1/7/2022 11:04 AM)  Period Cycle (Days): 31 (1/7/2022 11:04 AM)  Period Duration (Days): 5-10 (1/7/2022 11:04 AM)  [facility], and consistency   Grade 3 cystocele with uterine descensus  Extremities:  No lower extremity edema noted. Without clubbing or cyanosis.     Skin: Normal texture         Diagnostics:  As above    Data Review:        ASSESSMENT:  Uterovaginal pr

## 2022-01-13 NOTE — ANESTHESIA POSTPROCEDURE EVALUATION
[address] [facility] Patient Status:  [facility]   Age/Gender 48year old female MRN [id number]   [facility] SURGERY Attending Farida Marquez MD   Hosp Day # 0 PCP Ramya Shook MD       Anesthesia Post-

## 2022-01-13 NOTE — ANESTHESIA PROCEDURE NOTES
Airway  Date/Time: 1/13/2022 11:29 AM  Urgency: Elective      General Information and Staff    Patient location during procedure: OR  Anesthesiologist: Ayad Cox MD  Resident/CRNA: Jamaal Balderrama CRNA  Performed: CRNA     Indications and Patient Co

## 2022-01-13 NOTE — OPERATIVE REPORT
OPERATIVE REPORT      OPERATIVE REPORT    PREOPERATIVE DIAGNOSES:    1. Uterovaginal prolapse.    2. Cystoceole / Rectocele  POSTOPERATIVE DIAGNOSES:    1.  Same  2.  Enterocele  PROCEDURES PERFORMED:    1.  Total vaginal hysterectomy with bilateral s Gasper scissors dissection. We then re-placed the rectal blade retractor into the peritoneal cavity to protect the rectum. We then entered anteriorly between the bladder and the uterus without trauma to the bladder.  The peritoneum was entered with [facility]

## 2022-01-13 NOTE — INTERVAL H&P NOTE
Pre-op Diagnosis: UTEROVAGINAL PROLAPSE    The above referenced H&P was reviewed by Demario Mendosa MD on 1/13/2022, the patient was examined and no significant changes have occurred in the patient's condition since the H&P was performed.   I discussed with

## 2022-01-13 NOTE — OPERATIVE REPORT
PRE-OP DIAGNOSIS:  Uterovaginal prolapse, cystocele, rectocele    POST-OP DIAGNOSIS:  Same with enterocele    PROCEDURE:  Repair of enterocele; uterosacral ligament suspension; anterior colporrhaphy; posterior colporrhaphy; cystourethroscopy    SURGEON:  CLINTON connective tissue, reducing the cystocele. The excess vaginal epithelium was excised and the midline incision was then closed with 2-0 Vicryl suture in running locking fashion.   The lap sponge was removed from the peritoneal cavity and the vaginal apex wa

## 2022-01-13 NOTE — ANESTHESIA PREPROCEDURE EVALUATION
PRE-OP EVALUATION    Patient Name: Joe Valente    Admit Diagnosis: UTEROVAGINAL PROLAPSE    Pre-op Diagnosis: UTEROVAGINAL PROLAPSE    TOTAL VAGINAL HYSTERECTOMY, BILATERAL SALPINGECTOMY, CYSTOCELE REPAIR, RECTOCELE REPAIR  (Suzanna Funk) [facility]    Negative cardiovascular ROS. Exercise tolerance: good     MET: >4                                           Endo/Other    Negative endo/other ROS. Pulmonary    Negative pulmonary ROS. management modalities, transfusion if needed, etc. Questions answered and options explained. Patient accepts and wishes to proceed. The consent was signed without further questions.     Plan/risks discussed with: patient                Present on Admission:

## 2022-01-17 ENCOUNTER — TELEPHONE (OUTPATIENT)
Dept: UROLOGY | Facility: HOSPITAL | Age: 47
End: 2022-01-17

## 2022-01-17 NOTE — TELEPHONE ENCOUNTER
Surg-1/13/21-VH, BS, USLS, APER, Cysto  TC from pt asking if it is normal to have an odor to her vaginal discharge. Discussed how discharge is old blood and has an odor, this is normal. Pt went home w/ sevilla. Urine is draining and clear.  Pt had been consti

## 2022-01-18 NOTE — TELEPHONE ENCOUNTER
Pt calling for refill of norco today. Pt reports she has some cramping on and off. Pt got up to help her boys off to school and pt felt she was sore after. Pt plans to rest in bed the rest of the day.  Encouraged pt to get up often to ambulate around house,

## 2022-01-19 NOTE — TELEPHONE ENCOUNTER
Patient states she is seeing red specks in the sevilla bag. Urine is yellow and clear just a few a red specks. Explained it can happen, voiding trial tomorrow. Bowels have moved 2 times today with the MOM. Plan to stop MOM and add some psyllium husk.   Gal

## 2022-01-20 ENCOUNTER — OFFICE VISIT (OUTPATIENT)
Dept: UROLOGY | Facility: HOSPITAL | Age: 47
End: 2022-01-20
Attending: OBSTETRICS & GYNECOLOGY
Payer: COMMERCIAL

## 2022-01-20 VITALS — HEIGHT: 65.5 IN | TEMPERATURE: 98 F | WEIGHT: 134 LBS | BODY MASS INDEX: 22.06 KG/M2

## 2022-01-20 DIAGNOSIS — Z98.890 POSTOPERATIVE STATE: Primary | ICD-10-CM

## 2022-01-20 PROCEDURE — 99212 OFFICE O/P EST SF 10 MIN: CPT

## 2022-01-20 NOTE — PROCEDURES
Patient here for voiding trial.   Procedure Date: Surg-1/13/21-VH, BS, USLS, APER, Cysto  Doing well no complaints  BMs regular  Using Benefiber and MOM for bowel mgmt  Pain  Using Ibuprofen and Other ES Tylenol  for pain mgmt  Tolerates ambulation & diet

## 2022-01-21 ENCOUNTER — TELEPHONE (OUTPATIENT)
Dept: UROLOGY | Facility: HOSPITAL | Age: 47
End: 2022-01-21

## 2022-01-21 RX ORDER — NITROFURANTOIN 25; 75 MG/1; MG/1
100 CAPSULE ORAL 2 TIMES DAILY
Qty: 14 CAPSULE | Refills: 0 | Status: SHIPPED | OUTPATIENT
Start: 2022-01-21

## 2022-01-21 NOTE — TELEPHONE ENCOUNTER
Surg-1/13/21-VH, BS, USLS, APER, Cysto  TC from pt today saying she has a strong odor to her urine. Feels she is emptying well. Passed her VT yesterday. BIBI REYES (oncall for Dr Silke Arroyo) Macrobid 100mg po BID x 7d. If sx persist, may need a ucx.

## 2022-01-25 RX ORDER — CLONAZEPAM 0.5 MG/1
0.5 TABLET ORAL NIGHTLY PRN
Qty: 30 TABLET | Refills: 2 | Status: SHIPPED | OUTPATIENT
Start: 2022-01-25

## 2022-01-25 NOTE — TELEPHONE ENCOUNTER
Medication(s) to Refill:   Requested Prescriptions     Pending Prescriptions Disp Refills   • clonazePAM 0.5 MG Oral Tab 30 tablet 2     Sig: Take 1 tablet (0.5 mg total) by mouth nightly as needed for Anxiety.          Reason for Medication Refill being se

## 2022-02-23 RX ORDER — CLONAZEPAM 0.5 MG/1
0.5 TABLET ORAL NIGHTLY PRN
Qty: 30 TABLET | Refills: 0 | Status: SHIPPED | OUTPATIENT
Start: 2022-02-23 | End: 2022-03-29

## 2022-02-25 ENCOUNTER — OFFICE VISIT (OUTPATIENT)
Dept: UROLOGY | Facility: CLINIC | Age: 47
End: 2022-02-25
Attending: OBSTETRICS & GYNECOLOGY
Payer: COMMERCIAL

## 2022-02-25 VITALS — HEIGHT: 65.5 IN | WEIGHT: 134 LBS | BODY MASS INDEX: 22.06 KG/M2 | TEMPERATURE: 98 F

## 2022-02-25 DIAGNOSIS — Z98.890 POSTOPERATIVE STATE: Primary | ICD-10-CM

## 2022-02-25 PROCEDURE — 99212 OFFICE O/P EST SF 10 MIN: CPT

## 2022-03-01 ENCOUNTER — OFFICE VISIT (OUTPATIENT)
Dept: FAMILY MEDICINE CLINIC | Facility: CLINIC | Age: 47
End: 2022-03-01
Payer: COMMERCIAL

## 2022-03-01 VITALS
HEART RATE: 75 BPM | DIASTOLIC BLOOD PRESSURE: 80 MMHG | RESPIRATION RATE: 16 BRPM | SYSTOLIC BLOOD PRESSURE: 118 MMHG | OXYGEN SATURATION: 98 % | WEIGHT: 140 LBS | BODY MASS INDEX: 23.04 KG/M2 | HEIGHT: 65.5 IN

## 2022-03-01 DIAGNOSIS — J01.01 ACUTE RECURRENT MAXILLARY SINUSITIS: Primary | ICD-10-CM

## 2022-03-01 DIAGNOSIS — F41.1 GAD (GENERALIZED ANXIETY DISORDER): ICD-10-CM

## 2022-03-01 DIAGNOSIS — Z12.11 SCREENING FOR COLON CANCER: ICD-10-CM

## 2022-03-01 PROCEDURE — 3074F SYST BP LT 130 MM HG: CPT | Performed by: FAMILY MEDICINE

## 2022-03-01 PROCEDURE — 3008F BODY MASS INDEX DOCD: CPT | Performed by: FAMILY MEDICINE

## 2022-03-01 PROCEDURE — 99214 OFFICE O/P EST MOD 30 MIN: CPT | Performed by: FAMILY MEDICINE

## 2022-03-01 PROCEDURE — 3079F DIAST BP 80-89 MM HG: CPT | Performed by: FAMILY MEDICINE

## 2022-03-01 RX ORDER — MONTELUKAST SODIUM 10 MG/1
10 TABLET ORAL DAILY
Qty: 30 TABLET | Refills: 3 | Status: SHIPPED | OUTPATIENT
Start: 2022-03-01 | End: 2022-03-31

## 2022-03-01 RX ORDER — AZITHROMYCIN 250 MG/1
TABLET, FILM COATED ORAL
Qty: 6 TABLET | Refills: 0 | Status: SHIPPED | OUTPATIENT
Start: 2022-03-01 | End: 2022-03-06

## 2022-03-29 RX ORDER — CLONAZEPAM 0.5 MG/1
0.5 TABLET ORAL NIGHTLY PRN
Qty: 30 TABLET | Refills: 0 | Status: SHIPPED | OUTPATIENT
Start: 2022-03-29

## 2022-03-29 RX ORDER — CLONAZEPAM 0.5 MG/1
0.5 TABLET ORAL NIGHTLY PRN
Qty: 30 TABLET | Refills: 2 | Status: SHIPPED | OUTPATIENT
Start: 2022-03-29

## 2022-03-29 NOTE — TELEPHONE ENCOUNTER
Requesting refill on clonazepam.   LOV 3/1/2022. Advised to F/U in 6 months. LF 2/23/2022. Please approve or deny pending rx. Thank you.

## 2022-04-26 RX ORDER — CLONAZEPAM 0.5 MG/1
0.5 TABLET ORAL NIGHTLY PRN
Qty: 30 TABLET | Refills: 0 | Status: SHIPPED | OUTPATIENT
Start: 2022-04-26

## 2022-06-09 RX ORDER — CLONAZEPAM 0.5 MG/1
0.5 TABLET ORAL NIGHTLY PRN
Qty: 30 TABLET | Refills: 0 | Status: SHIPPED | OUTPATIENT
Start: 2022-06-09

## 2022-07-20 ENCOUNTER — OFFICE VISIT (OUTPATIENT)
Dept: FAMILY MEDICINE CLINIC | Facility: CLINIC | Age: 47
End: 2022-07-20
Payer: COMMERCIAL

## 2022-07-20 VITALS
RESPIRATION RATE: 16 BRPM | HEART RATE: 86 BPM | DIASTOLIC BLOOD PRESSURE: 80 MMHG | BODY MASS INDEX: 24.36 KG/M2 | HEIGHT: 65.5 IN | OXYGEN SATURATION: 99 % | WEIGHT: 148 LBS | SYSTOLIC BLOOD PRESSURE: 120 MMHG

## 2022-07-20 DIAGNOSIS — H66.90 SUBACUTE OTITIS MEDIA, UNSPECIFIED OTITIS MEDIA TYPE: Primary | ICD-10-CM

## 2022-07-20 DIAGNOSIS — J01.01 ACUTE RECURRENT MAXILLARY SINUSITIS: ICD-10-CM

## 2022-07-20 DIAGNOSIS — R42 DIZZINESS: ICD-10-CM

## 2022-07-20 PROCEDURE — 3008F BODY MASS INDEX DOCD: CPT | Performed by: FAMILY MEDICINE

## 2022-07-20 PROCEDURE — 3079F DIAST BP 80-89 MM HG: CPT | Performed by: FAMILY MEDICINE

## 2022-07-20 PROCEDURE — 99214 OFFICE O/P EST MOD 30 MIN: CPT | Performed by: FAMILY MEDICINE

## 2022-07-20 PROCEDURE — 3074F SYST BP LT 130 MM HG: CPT | Performed by: FAMILY MEDICINE

## 2022-07-20 RX ORDER — AZITHROMYCIN 250 MG/1
TABLET, FILM COATED ORAL
Qty: 6 TABLET | Refills: 0 | Status: SHIPPED | OUTPATIENT
Start: 2022-07-20 | End: 2022-07-25

## 2022-08-04 RX ORDER — CLONAZEPAM 0.5 MG/1
0.5 TABLET ORAL NIGHTLY PRN
Qty: 30 TABLET | Refills: 0 | Status: SHIPPED | OUTPATIENT
Start: 2022-08-04

## 2022-08-19 ENCOUNTER — OFFICE VISIT (OUTPATIENT)
Dept: UROLOGY | Facility: CLINIC | Age: 47
End: 2022-08-19
Attending: OBSTETRICS & GYNECOLOGY
Payer: COMMERCIAL

## 2022-08-19 VITALS — TEMPERATURE: 98 F | WEIGHT: 148 LBS | BODY MASS INDEX: 24.36 KG/M2 | HEIGHT: 65.5 IN

## 2022-08-19 DIAGNOSIS — N81.84 PELVIC MUSCLE WASTING: ICD-10-CM

## 2022-08-19 DIAGNOSIS — Z98.890 POSTOPERATIVE STATE: Primary | ICD-10-CM

## 2022-08-19 PROCEDURE — 99212 OFFICE O/P EST SF 10 MIN: CPT

## 2022-09-15 ENCOUNTER — OFFICE VISIT (OUTPATIENT)
Dept: FAMILY MEDICINE CLINIC | Facility: CLINIC | Age: 47
End: 2022-09-15
Payer: COMMERCIAL

## 2022-09-15 VITALS — WEIGHT: 156 LBS | BODY MASS INDEX: 25.99 KG/M2 | HEIGHT: 65 IN

## 2022-09-15 DIAGNOSIS — H70.91 MASTOIDITIS OF RIGHT SIDE: ICD-10-CM

## 2022-09-15 DIAGNOSIS — H66.90 ACUTE OTITIS MEDIA, UNSPECIFIED OTITIS MEDIA TYPE: ICD-10-CM

## 2022-09-15 DIAGNOSIS — J01.11 ACUTE RECURRENT FRONTAL SINUSITIS: Primary | ICD-10-CM

## 2022-09-15 PROCEDURE — 3008F BODY MASS INDEX DOCD: CPT | Performed by: NURSE PRACTITIONER

## 2022-09-15 PROCEDURE — 99214 OFFICE O/P EST MOD 30 MIN: CPT | Performed by: NURSE PRACTITIONER

## 2022-09-15 RX ORDER — SERTRALINE HYDROCHLORIDE 100 MG/1
100 TABLET, FILM COATED ORAL DAILY
COMMUNITY
Start: 2022-09-07

## 2022-09-15 RX ORDER — PREDNISONE 20 MG/1
TABLET ORAL
Qty: 9 TABLET | Refills: 0 | Status: SHIPPED | OUTPATIENT
Start: 2022-09-15

## 2022-09-15 RX ORDER — AMOXICILLIN AND CLAVULANATE POTASSIUM 875; 125 MG/1; MG/1
1 TABLET, FILM COATED ORAL 2 TIMES DAILY
Qty: 14 TABLET | Refills: 0 | Status: SHIPPED | OUTPATIENT
Start: 2022-09-15 | End: 2022-09-22

## 2022-12-17 ENCOUNTER — OFFICE VISIT (OUTPATIENT)
Dept: FAMILY MEDICINE CLINIC | Facility: CLINIC | Age: 47
End: 2022-12-17
Payer: COMMERCIAL

## 2022-12-17 VITALS
TEMPERATURE: 99 F | RESPIRATION RATE: 16 BRPM | DIASTOLIC BLOOD PRESSURE: 80 MMHG | HEIGHT: 66 IN | BODY MASS INDEX: 24.11 KG/M2 | WEIGHT: 150 LBS | SYSTOLIC BLOOD PRESSURE: 119 MMHG | HEART RATE: 66 BPM | OXYGEN SATURATION: 98 %

## 2022-12-17 DIAGNOSIS — R09.81 NASAL CONGESTION: Primary | ICD-10-CM

## 2022-12-17 DIAGNOSIS — J06.9 VIRAL UPPER RESPIRATORY TRACT INFECTION: ICD-10-CM

## 2022-12-17 PROCEDURE — 3008F BODY MASS INDEX DOCD: CPT | Performed by: NURSE PRACTITIONER

## 2022-12-17 PROCEDURE — 3074F SYST BP LT 130 MM HG: CPT | Performed by: NURSE PRACTITIONER

## 2022-12-17 PROCEDURE — 99213 OFFICE O/P EST LOW 20 MIN: CPT | Performed by: NURSE PRACTITIONER

## 2022-12-17 PROCEDURE — 3079F DIAST BP 80-89 MM HG: CPT | Performed by: NURSE PRACTITIONER

## 2022-12-19 LAB — SARS-COV-2 RNA RESP QL NAA+PROBE: DETECTED

## 2023-08-31 ENCOUNTER — OFFICE VISIT (OUTPATIENT)
Dept: FAMILY MEDICINE CLINIC | Facility: CLINIC | Age: 48
End: 2023-08-31

## 2023-08-31 VITALS
WEIGHT: 145 LBS | SYSTOLIC BLOOD PRESSURE: 124 MMHG | DIASTOLIC BLOOD PRESSURE: 78 MMHG | OXYGEN SATURATION: 99 % | TEMPERATURE: 97 F | BODY MASS INDEX: 24.16 KG/M2 | HEIGHT: 65 IN | RESPIRATION RATE: 16 BRPM | HEART RATE: 90 BPM

## 2023-08-31 DIAGNOSIS — Z02.1 PRE-EMPLOYMENT EXAMINATION: Primary | ICD-10-CM

## 2023-08-31 PROCEDURE — 99396 PREV VISIT EST AGE 40-64: CPT

## 2023-10-16 ENCOUNTER — TELEPHONE (OUTPATIENT)
Dept: FAMILY MEDICINE CLINIC | Facility: CLINIC | Age: 48
End: 2023-10-16

## 2023-10-16 NOTE — TELEPHONE ENCOUNTER
Please phone pt to schedule annual physical last was in May 2021. Overdue for labs and has not seen PCP since 7/20/22. Please route back to triage once OV w/ BW is scheduled.

## 2023-10-16 NOTE — TELEPHONE ENCOUNTER
Pt is requesting orders for Colorectal Cancer Screening and Mammogram. As she will like to start scheduling those appts.

## 2023-10-20 ENCOUNTER — OFFICE VISIT (OUTPATIENT)
Dept: FAMILY MEDICINE CLINIC | Facility: CLINIC | Age: 48
End: 2023-10-20
Payer: COMMERCIAL

## 2023-10-20 VITALS
HEIGHT: 65 IN | RESPIRATION RATE: 16 BRPM | BODY MASS INDEX: 25.83 KG/M2 | DIASTOLIC BLOOD PRESSURE: 80 MMHG | OXYGEN SATURATION: 98 % | WEIGHT: 155 LBS | SYSTOLIC BLOOD PRESSURE: 112 MMHG | HEART RATE: 60 BPM

## 2023-10-20 DIAGNOSIS — Z00.00 ANNUAL PHYSICAL EXAM: Primary | ICD-10-CM

## 2023-10-20 DIAGNOSIS — Z13.6 SCREENING FOR HEART DISEASE: ICD-10-CM

## 2023-10-20 DIAGNOSIS — Z00.00 LABORATORY EXAMINATION ORDERED AS PART OF A ROUTINE GENERAL MEDICAL EXAMINATION: ICD-10-CM

## 2023-10-20 DIAGNOSIS — Z12.31 ENCOUNTER FOR SCREENING MAMMOGRAM FOR MALIGNANT NEOPLASM OF BREAST: ICD-10-CM

## 2023-10-20 DIAGNOSIS — Z12.11 COLON CANCER SCREENING: ICD-10-CM

## 2023-10-20 PROBLEM — J01.01 ACUTE RECURRENT MAXILLARY SINUSITIS: Status: RESOLVED | Noted: 2020-03-25 | Resolved: 2023-10-20

## 2023-10-20 PROCEDURE — 99396 PREV VISIT EST AGE 40-64: CPT | Performed by: FAMILY MEDICINE

## 2023-10-20 PROCEDURE — 3008F BODY MASS INDEX DOCD: CPT | Performed by: FAMILY MEDICINE

## 2023-10-20 PROCEDURE — 3074F SYST BP LT 130 MM HG: CPT | Performed by: FAMILY MEDICINE

## 2023-10-20 PROCEDURE — 3079F DIAST BP 80-89 MM HG: CPT | Performed by: FAMILY MEDICINE

## 2023-10-21 ENCOUNTER — LAB ENCOUNTER (OUTPATIENT)
Dept: LAB | Age: 48
End: 2023-10-21
Attending: FAMILY MEDICINE
Payer: COMMERCIAL

## 2023-10-21 DIAGNOSIS — Z00.00 LABORATORY EXAMINATION ORDERED AS PART OF A ROUTINE GENERAL MEDICAL EXAMINATION: ICD-10-CM

## 2023-10-21 LAB
ALBUMIN SERPL-MCNC: 3.7 G/DL (ref 3.4–5)
ALBUMIN/GLOB SERPL: 0.8 {RATIO} (ref 1–2)
ALP LIVER SERPL-CCNC: 67 U/L
ALT SERPL-CCNC: 21 U/L
ANION GAP SERPL CALC-SCNC: 6 MMOL/L (ref 0–18)
AST SERPL-CCNC: 11 U/L (ref 15–37)
BASOPHILS # BLD AUTO: 0.02 X10(3) UL (ref 0–0.2)
BASOPHILS NFR BLD AUTO: 0.4 %
BILIRUB SERPL-MCNC: 0.5 MG/DL (ref 0.1–2)
BUN BLD-MCNC: 16 MG/DL (ref 7–18)
CALCIUM BLD-MCNC: 9.1 MG/DL (ref 8.5–10.1)
CHLORIDE SERPL-SCNC: 108 MMOL/L (ref 98–112)
CHOLEST SERPL-MCNC: 192 MG/DL (ref ?–200)
CO2 SERPL-SCNC: 26 MMOL/L (ref 21–32)
CREAT BLD-MCNC: 0.58 MG/DL
EGFRCR SERPLBLD CKD-EPI 2021: 110 ML/MIN/1.73M2 (ref 60–?)
EOSINOPHIL # BLD AUTO: 0.13 X10(3) UL (ref 0–0.7)
EOSINOPHIL NFR BLD AUTO: 2.7 %
ERYTHROCYTE [DISTWIDTH] IN BLOOD BY AUTOMATED COUNT: 13.7 %
FASTING PATIENT LIPID ANSWER: YES
FASTING STATUS PATIENT QL REPORTED: YES
GLOBULIN PLAS-MCNC: 4.5 G/DL (ref 2.8–4.4)
GLUCOSE BLD-MCNC: 95 MG/DL (ref 70–99)
HCT VFR BLD AUTO: 42.6 %
HDLC SERPL-MCNC: 61 MG/DL (ref 40–59)
HGB BLD-MCNC: 13.6 G/DL
IMM GRANULOCYTES # BLD AUTO: 0 X10(3) UL (ref 0–1)
IMM GRANULOCYTES NFR BLD: 0 %
LDLC SERPL CALC-MCNC: 124 MG/DL (ref ?–100)
LYMPHOCYTES # BLD AUTO: 2.11 X10(3) UL (ref 1–4)
LYMPHOCYTES NFR BLD AUTO: 43.1 %
MCH RBC QN AUTO: 27.8 PG (ref 26–34)
MCHC RBC AUTO-ENTMCNC: 31.9 G/DL (ref 31–37)
MCV RBC AUTO: 87.1 FL
MONOCYTES # BLD AUTO: 0.41 X10(3) UL (ref 0.1–1)
MONOCYTES NFR BLD AUTO: 8.4 %
NEUTROPHILS # BLD AUTO: 2.23 X10 (3) UL (ref 1.5–7.7)
NEUTROPHILS # BLD AUTO: 2.23 X10(3) UL (ref 1.5–7.7)
NEUTROPHILS NFR BLD AUTO: 45.4 %
NONHDLC SERPL-MCNC: 131 MG/DL (ref ?–130)
OSMOLALITY SERPL CALC.SUM OF ELEC: 291 MOSM/KG (ref 275–295)
PLATELET # BLD AUTO: 230 10(3)UL (ref 150–450)
POTASSIUM SERPL-SCNC: 4.3 MMOL/L (ref 3.5–5.1)
PROT SERPL-MCNC: 8.2 G/DL (ref 6.4–8.2)
RBC # BLD AUTO: 4.89 X10(6)UL
SODIUM SERPL-SCNC: 140 MMOL/L (ref 136–145)
TRIGL SERPL-MCNC: 37 MG/DL (ref 30–149)
TSI SER-ACNC: 1.7 MIU/ML (ref 0.36–3.74)
VLDLC SERPL CALC-MCNC: 7 MG/DL (ref 0–30)
WBC # BLD AUTO: 4.9 X10(3) UL (ref 4–11)

## 2023-10-21 PROCEDURE — 80061 LIPID PANEL: CPT | Performed by: FAMILY MEDICINE

## 2023-10-21 PROCEDURE — 80050 GENERAL HEALTH PANEL: CPT | Performed by: FAMILY MEDICINE

## 2023-11-09 ENCOUNTER — OFFICE VISIT (OUTPATIENT)
Dept: FAMILY MEDICINE CLINIC | Facility: CLINIC | Age: 48
End: 2023-11-09
Payer: COMMERCIAL

## 2023-11-09 VITALS
WEIGHT: 157 LBS | HEIGHT: 65 IN | HEART RATE: 73 BPM | OXYGEN SATURATION: 97 % | DIASTOLIC BLOOD PRESSURE: 83 MMHG | SYSTOLIC BLOOD PRESSURE: 122 MMHG | TEMPERATURE: 98 F | BODY MASS INDEX: 26.16 KG/M2

## 2023-11-09 DIAGNOSIS — J06.9 VIRAL UPPER RESPIRATORY ILLNESS: Primary | ICD-10-CM

## 2023-11-09 LAB
OPERATOR ID: NORMAL
POCT LOT NUMBER: NORMAL
RAPID SARS-COV-2 BY PCR: NOT DETECTED

## 2023-11-09 PROCEDURE — 3074F SYST BP LT 130 MM HG: CPT | Performed by: NURSE PRACTITIONER

## 2023-11-09 PROCEDURE — 99213 OFFICE O/P EST LOW 20 MIN: CPT | Performed by: NURSE PRACTITIONER

## 2023-11-09 PROCEDURE — U0002 COVID-19 LAB TEST NON-CDC: HCPCS | Performed by: NURSE PRACTITIONER

## 2023-11-09 PROCEDURE — 3008F BODY MASS INDEX DOCD: CPT | Performed by: NURSE PRACTITIONER

## 2023-11-09 PROCEDURE — 3079F DIAST BP 80-89 MM HG: CPT | Performed by: NURSE PRACTITIONER

## 2024-03-12 ENCOUNTER — OFFICE VISIT (OUTPATIENT)
Dept: FAMILY MEDICINE CLINIC | Facility: CLINIC | Age: 49
End: 2024-03-12
Payer: COMMERCIAL

## 2024-03-12 VITALS
SYSTOLIC BLOOD PRESSURE: 114 MMHG | BODY MASS INDEX: 26 KG/M2 | RESPIRATION RATE: 16 BRPM | OXYGEN SATURATION: 98 % | HEIGHT: 65 IN | HEART RATE: 80 BPM | DIASTOLIC BLOOD PRESSURE: 70 MMHG | TEMPERATURE: 98 F

## 2024-03-12 DIAGNOSIS — J06.9 VIRAL URI: Primary | ICD-10-CM

## 2024-03-12 PROCEDURE — 3078F DIAST BP <80 MM HG: CPT | Performed by: NURSE PRACTITIONER

## 2024-03-12 PROCEDURE — 3074F SYST BP LT 130 MM HG: CPT | Performed by: NURSE PRACTITIONER

## 2024-03-12 PROCEDURE — 3008F BODY MASS INDEX DOCD: CPT | Performed by: NURSE PRACTITIONER

## 2024-03-12 PROCEDURE — 99213 OFFICE O/P EST LOW 20 MIN: CPT | Performed by: NURSE PRACTITIONER

## 2024-03-12 NOTE — PATIENT INSTRUCTIONS
Flonase  Decongestant if needed  Push fluids and rest  Follow up for new or worsening symptoms or if not improving over the next week.

## 2024-03-12 NOTE — PROGRESS NOTES
CHIEF COMPLAINT:     Chief Complaint   Patient presents with    Sinus Problem     S/s since AM, nasal congestion head ache.  OTC nasal sprayed used.         HPI:   Adalgisa Valente is a 49 year old female who presents for rhinitis and sinus congestion, headache at times after blowing her nose. Symptoms started this morning. Symptoms have been unchanged since onset.  Treating symptoms with afrin.  Denies fever or cough.    Current Outpatient Medications   Medication Sig Dispense Refill    sertraline 100 MG Oral Tab Take 2 tablets (200 mg total) by mouth daily.      Magnesium 300 MG Oral Cap Take 200 mg by mouth.        Past Medical History:   Diagnosis Date    Anxiety state, unspecified     Prozac 40mg    Depression     Prozac 40mg    Visual impairment     glasses and contacts      Past Surgical History:   Procedure Laterality Date    ANTERIOR REPAIR  2022    BREAST BIOPSY  2019    R. breast biopsy     ENTEROCELE REPAIR  2022    HYSTERECTOMY  2022    TVH/BS    NEEDLE BIOPSY RIGHT Right 2019    benign fibroadenoma, dense stroma fibrosis    POSTERIOR REPAIR  2022    UTEROSACRAL LIGAMENT SUSPENSION  2022         Social History     Socioeconomic History    Marital status:    Tobacco Use    Smoking status: Former     Packs/day: 0.50     Years: 8.00     Additional pack years: 0.00     Total pack years: 4.00     Types: Cigarettes     Quit date: 2008     Years since quittin.1    Smokeless tobacco: Never   Vaping Use    Vaping Use: Former    Substances: THC    Devices: Disposable, Pre-filled or refillable cartridge, Refillable tank   Substance and Sexual Activity    Alcohol use: No    Drug use: No    Sexual activity: Yes     Partners: Male     Comment: withdrawal         REVIEW OF SYSTEMS:   GENERAL: normal appetite  SKIN: no rashes or abnormal skin lesions  HEENT: See HPI  LUNGS: denies shortness of breath or wheezing, See HPI  CARDIOVASCULAR: denies chest pain  or palpitations   GI: denies N/V/C or abdominal pain  NEURO: Denies dizziness or weakness    EXAM:   /70   Pulse 80   Temp 98.2 °F (36.8 °C) (Oral)   Resp 16   Ht 5' 5\" (1.651 m)   LMP 12/01/2021   SpO2 98%   BMI 26.13 kg/m²   GENERAL: well developed, well nourished,in no apparent distress  SKIN: no rashes,no suspicious lesions  HEAD: atraumatic, normocephalic.    EYES: conjunctiva clear, EOM intact  EARS: TM's gray, no bulging, no retraction,no fluid, bony landmarks visible  NOSE: Nostrils patent, clear nasal discharge, nasal mucosa erythematous   THROAT: Oral mucosa pink, moist. Posterior pharynx is non erythematous. no exudates. Tonsils 1/4.    NECK: Supple, non-tender  LUNGS: clear to auscultation bilaterally, no wheezes or rhonchi. Breathing is non labored.  CARDIO: RRR without murmur  EXTREMITIES: no cyanosis, clubbing or edema  LYMPH:  no lymphadenopathy.        ASSESSMENT AND PLAN:   Adalgisa Valente is a 49 year old female who presents with upper respiratory symptoms that are consistent with    ASSESSMENT:   Encounter Diagnosis   Name Primary?    Viral URI Yes         PLAN:     Comfort care per pt instructions.   Offered COVID testing, pt states she will test at home.   To follow up for any new or worsening symptoms or if not improving the next few days.     Meds & Refills for this Visit:  Requested Prescriptions      No prescriptions requested or ordered in this encounter       Risks, benefits, and side effects of medication explained and discussed.    Patient Instructions   Flonase  Decongestant if needed  Push fluids and rest  Follow up for new or worsening symptoms or if not improving over the next week.     The patient indicates understanding of these issues and agrees to the plan.  The patient is asked to return if sx's persist or worsen.

## 2024-08-28 ENCOUNTER — OFFICE VISIT (OUTPATIENT)
Dept: FAMILY MEDICINE CLINIC | Facility: CLINIC | Age: 49
End: 2024-08-28
Payer: COMMERCIAL

## 2024-08-28 VITALS
DIASTOLIC BLOOD PRESSURE: 82 MMHG | TEMPERATURE: 98 F | HEART RATE: 67 BPM | OXYGEN SATURATION: 98 % | SYSTOLIC BLOOD PRESSURE: 118 MMHG | RESPIRATION RATE: 18 BRPM

## 2024-08-28 DIAGNOSIS — S56.511A STRAIN OF OTHER EXTENSOR MUSCLE, FASCIA AND TENDON AT FOREARM LEVEL, RIGHT ARM, INITIAL ENCOUNTER: Primary | ICD-10-CM

## 2024-08-28 PROCEDURE — 3079F DIAST BP 80-89 MM HG: CPT | Performed by: NURSE PRACTITIONER

## 2024-08-28 PROCEDURE — 3074F SYST BP LT 130 MM HG: CPT | Performed by: NURSE PRACTITIONER

## 2024-08-28 PROCEDURE — 99213 OFFICE O/P EST LOW 20 MIN: CPT | Performed by: NURSE PRACTITIONER

## 2024-08-28 NOTE — PROGRESS NOTES
Chief Complaint   Patient presents with    Muscle Pain     Pain at right forearm for 3 days.  No OTC meds taken, per pt, s/s occurred while working out.       HPI:     Adalgisa Valente is a 49 year old female who presents today with a chief complaint of  right forearm pain.  Cause - was doing a workout that targeted accessory muscles  Onset - 3 days  Location of pain - medial aspect of right forearm  Pain described as -  aching with use .  Pain severity moderate  Radiation - none  Pain is aggravated by movement, use and palpation  Pain is alleviated by rest  Symptoms associated with pain include none.  Any prior injury to arm - no  Prior surgeries to arm - no  Care prior to arrival consisted of rest, NSAID, and ice, with minimal relief.     Current Outpatient Medications   Medication Sig Dispense Refill    sertraline 100 MG Oral Tab Take 2 tablets (200 mg total) by mouth daily.      Magnesium 300 MG Oral Cap Take 200 mg by mouth.        Past Medical History:    Anxiety state, unspecified    Prozac 40mg    Depression    Prozac 40mg    Visual impairment    glasses and contacts      Social History:  Social History     Socioeconomic History    Marital status:    Tobacco Use    Smoking status: Former     Current packs/day: 0.00     Average packs/day: 0.5 packs/day for 8.0 years (4.0 ttl pk-yrs)     Types: Cigarettes     Start date: 2000     Quit date: 2008     Years since quittin.5    Smokeless tobacco: Never   Vaping Use    Vaping status: Former    Substances: THC    Devices: Disposable, Pre-filled or refillable cartridge, Refillable tank   Substance and Sexual Activity    Alcohol use: No    Drug use: No    Sexual activity: Yes     Partners: Male     Comment: withdrawal        REVIEW OF SYSTEMS:   GENERAL HEALTH: feels well otherwise  SKIN: denies any unusual skin lesions or rashes  RESPIRATORY: denies shortness of breath with exertion  CARDIOVASCULAR: denies chest pain on exertion  GI:  denies abdominal pain and denies heartburn  NEURO: denies headaches  Musculoskeletal: elbow pain as described above.         EXAM:   /82   Pulse 67   Temp 98.4 °F (36.9 °C) (Oral)   Resp 18   LMP 12/01/2021   SpO2 98%   GENERAL: well developed, well nourished,in no apparent distress  SKIN: no rashes,no suspicious lesions  HEENT: atraumatic, normocephalic,ears and throat are clear  NECK: supple,no adenopathy,no bruits  LUNGS: clear to auscultation  CARDIO: RRR without murmur  GI: good BS's,no masses, HSM or tenderness  EXTREMITIES: Rest of the extremities -->  no cyanosis, clubbing or edema  Exam of right forearm -->   - swelling: none  - deformity/defect: none  - crepitus: none  - ecchymosis/bruising: none    - Supination: no pain  - Pronation: no pain  - Extension : no pain  - Flexion: minimal pain with resistance  - olecranon bursitis: no  - Tendons intact: yes  - Rpulses: 3+  - Cap refill: less than 3 seconds  - sensation: intact  - Motor exam/strength/hand : equal an strong        ASSESSMENT AND PLAN:     Encounter Diagnosis   Name Primary?    Strain of other extensor muscle, fascia and tendon at forearm level, right arm, initial encounter Yes       No orders of the defined types were placed in this encounter.      Meds & Refills for this Visit:  Requested Prescriptions      No prescriptions requested or ordered in this encounter       Imaging & Consults:  None    Follow Up with:  No follow-up provider specified.      Rest, ice, heat, elevate  Tylenol/Motrin for pain  Avoid heavy lifting, gripping, grasping  Recheck with pcp or ortho in 3-5 week if not better  The patient/parent indicates understanding of these issues and agrees to the plan.

## 2024-09-30 ENCOUNTER — OFFICE VISIT (OUTPATIENT)
Facility: CLINIC | Age: 49
End: 2024-09-30
Payer: COMMERCIAL

## 2024-09-30 VITALS
HEIGHT: 65 IN | SYSTOLIC BLOOD PRESSURE: 122 MMHG | BODY MASS INDEX: 29.55 KG/M2 | WEIGHT: 177.38 LBS | DIASTOLIC BLOOD PRESSURE: 76 MMHG

## 2024-09-30 DIAGNOSIS — Z90.710 S/P VAGINAL HYSTERECTOMY: ICD-10-CM

## 2024-09-30 DIAGNOSIS — N89.8 VAGINAL ITCHING: Primary | ICD-10-CM

## 2024-09-30 PROCEDURE — 99212 OFFICE O/P EST SF 10 MIN: CPT | Performed by: OBSTETRICS & GYNECOLOGY

## 2024-09-30 PROCEDURE — 3074F SYST BP LT 130 MM HG: CPT | Performed by: OBSTETRICS & GYNECOLOGY

## 2024-09-30 PROCEDURE — 3008F BODY MASS INDEX DOCD: CPT | Performed by: OBSTETRICS & GYNECOLOGY

## 2024-09-30 PROCEDURE — 3078F DIAST BP <80 MM HG: CPT | Performed by: OBSTETRICS & GYNECOLOGY

## 2024-09-30 NOTE — PROGRESS NOTES
Gynecology Office Visit      Adalgisa Valente is a 49 year old female  Patient's last menstrual period was 2021. (contraception:  na)     HPI:     Chief Complaint   Patient presents with    Vaginal Problem     Itchy around the vagina, but not necessarily inside the vagina.      Vulvar itching not vaginal itching     Used a steroid cream that she had from dermatologist and the symptoms resolevd  Noticed symptoms after doing cold water baths after hot yoga - helping sleep - wants to continue if ok    No VM symptoms yet    S/p TVHBS hernia repairs USLS in  - no other pelvic complaints - no MAGDALENO      Chart and previous encounters reviewed.  HISTORY:  Past Medical History:    Anxiety state, unspecified    Prozac 40mg    Depression    Prozac 40mg    Visual impairment    glasses and contacts      Past Surgical History:   Procedure Laterality Date    Anterior repair  2022    Breast biopsy  2019    R. breast biopsy     Enterocele repair  2022    Hysterectomy  2022    TVH/BS    Needle biopsy right Right 2019    benign fibroadenoma, dense stroma fibrosis    Posterior repair  2022    Uterosacral ligament suspension  2022      Family History   Problem Relation Age of Onset    Other (Other) Father 49        Alcoholism    Cancer Maternal Grandmother 70        Breast Ca    No Known Problems Brother     No Known Problems Sister     Heart Disease Neg     Stroke Neg       Social History:   Social History     Socioeconomic History    Marital status:    Tobacco Use    Smoking status: Former     Current packs/day: 0.00     Average packs/day: 0.5 packs/day for 8.0 years (4.0 ttl pk-yrs)     Types: Cigarettes     Start date: 2000     Quit date: 2008     Years since quittin.6    Smokeless tobacco: Never   Vaping Use    Vaping status: Former    Substances: THC    Devices: Disposable, Pre-filled or refillable cartridge, Refillable tank   Substance and  Sexual Activity    Alcohol use: No    Drug use: No    Sexual activity: Yes     Partners: Male     Comment: withdrawal        Medications (Active prior to today's visit):  Current Outpatient Medications   Medication Sig Dispense Refill    sertraline 100 MG Oral Tab Take 2 tablets (200 mg total) by mouth daily.      Magnesium 300 MG Oral Cap Take 200 mg by mouth.         Allergies:  No Known Allergies    Gyn:       OB Hx:  OB History    Para Term  AB Living   3 2 2   1 2   SAB IAB Ectopic Multiple Live Births   1       2      # Outcome Date GA Lbr Antoine/2nd Weight Sex Type Anes PTL Lv   3 Term 12 40w0d  9 lb 2 oz (4.139 kg) M NORMAL SPONT   JOLYNN   2 Term 10/16/09 40w0d  9 lb 7 oz (4.281 kg) M NORMAL SPONT   JOLYNN   1 SAB                  ROS:     10 point ROS completed and was negative, except for pertinent positive and negatives stated in the HPI.    PHYSICAL EXAM:   /76   Ht 65\"   Wt 177 lb 6.4 oz (80.5 kg)   LMP 2021   BMI 29.52 kg/m²      Wt Readings from Last 6 Encounters:   24 177 lb 6.4 oz (80.5 kg)   23 157 lb (71.2 kg)   10/20/23 155 lb (70.3 kg)   23 145 lb (65.8 kg)   22 150 lb (68 kg)   09/15/22 156 lb (70.8 kg)        Gen:  Oriented, in no acute distress  Abdomen: no scars, scaphoid, benign without peritoneal signs, rebound tenderness,   guarding, or masses    Pelvic:      External Genitalia: Normal appearing, no lesions. Normal exam   Vagina: normal pink mucosa, no lesions, normal clear discharge.    Cuff well healed and supported  Adnexa: non tender, no masses, normal size  Rectal: deferred       ASSESSMENT/PLAN:     1. Vulvar itching - resolved with steroid cream    2. S/P vaginal hysterectomy - repair or hernias, USLS - prolapse         Meds This Visit:  Requested Prescriptions      No prescriptions requested or ordered in this encounter       Imaging & Referrals:  None     Return in about 2 months (around 2024) for Well Woman Exam.      MMG ordered - still needs well woman exams      Paulie Lawrence MD  9/30/2024  12:28 PM         This note was created by [employer] voice recognition. Errors in content may be related to improper recognition by the system; efforts to review and correct have been done but errors may still exist. Please contact me with any questions.    Note to patient and family   The 21st Century Cures Act makes medical notes available to patients in the interest of transparency.  However, please be advised that this is a medical document.  It is intended as [id number] communication.  It is written and medical language may contain abbreviations or verbiage that are technical and unfamiliar.  It may appear blunt or direct.  Medical documents are intended to carry relevant information, facts as evident, and the clinical opinion of the practitioner.

## 2024-12-16 ENCOUNTER — LAB ENCOUNTER (OUTPATIENT)
Dept: LAB | Age: 49
End: 2024-12-16
Attending: FAMILY MEDICINE
Payer: COMMERCIAL

## 2024-12-16 ENCOUNTER — OFFICE VISIT (OUTPATIENT)
Dept: FAMILY MEDICINE CLINIC | Facility: CLINIC | Age: 49
End: 2024-12-16
Payer: COMMERCIAL

## 2024-12-16 VITALS
OXYGEN SATURATION: 98 % | DIASTOLIC BLOOD PRESSURE: 72 MMHG | WEIGHT: 172 LBS | SYSTOLIC BLOOD PRESSURE: 118 MMHG | TEMPERATURE: 98 F | HEART RATE: 61 BPM | RESPIRATION RATE: 18 BRPM | BODY MASS INDEX: 28.66 KG/M2 | HEIGHT: 65 IN

## 2024-12-16 DIAGNOSIS — Z00.00 ROUTINE GENERAL MEDICAL EXAMINATION AT A HEALTH CARE FACILITY: ICD-10-CM

## 2024-12-16 DIAGNOSIS — Z12.11 COLON CANCER SCREENING: ICD-10-CM

## 2024-12-16 DIAGNOSIS — Z12.31 VISIT FOR SCREENING MAMMOGRAM: ICD-10-CM

## 2024-12-16 DIAGNOSIS — Z00.00 ROUTINE GENERAL MEDICAL EXAMINATION AT A HEALTH CARE FACILITY: Primary | ICD-10-CM

## 2024-12-16 DIAGNOSIS — F41.1 GAD (GENERALIZED ANXIETY DISORDER): ICD-10-CM

## 2024-12-16 LAB
ALBUMIN SERPL-MCNC: 4.7 G/DL (ref 3.2–4.8)
ALBUMIN/GLOB SERPL: 1.6 {RATIO} (ref 1–2)
ALP LIVER SERPL-CCNC: 80 U/L
ALT SERPL-CCNC: 9 U/L
ANION GAP SERPL CALC-SCNC: 8 MMOL/L (ref 0–18)
AST SERPL-CCNC: 15 U/L (ref ?–34)
BASOPHILS # BLD AUTO: 0.04 X10(3) UL (ref 0–0.2)
BASOPHILS NFR BLD AUTO: 0.6 %
BILIRUB SERPL-MCNC: 0.6 MG/DL (ref 0.3–1.2)
BUN BLD-MCNC: 16 MG/DL (ref 9–23)
CALCIUM BLD-MCNC: 9.8 MG/DL (ref 8.7–10.4)
CHLORIDE SERPL-SCNC: 105 MMOL/L (ref 98–112)
CHOLEST SERPL-MCNC: 230 MG/DL (ref ?–200)
CO2 SERPL-SCNC: 28 MMOL/L (ref 21–32)
CREAT BLD-MCNC: 0.62 MG/DL
EGFRCR SERPLBLD CKD-EPI 2021: 108 ML/MIN/1.73M2 (ref 60–?)
EOSINOPHIL # BLD AUTO: 0.07 X10(3) UL (ref 0–0.7)
EOSINOPHIL NFR BLD AUTO: 1.1 %
ERYTHROCYTE [DISTWIDTH] IN BLOOD BY AUTOMATED COUNT: 14 %
FASTING PATIENT LIPID ANSWER: YES
FASTING STATUS PATIENT QL REPORTED: YES
GLOBULIN PLAS-MCNC: 2.9 G/DL (ref 2–3.5)
GLUCOSE BLD-MCNC: 89 MG/DL (ref 70–99)
HCT VFR BLD AUTO: 41.8 %
HDLC SERPL-MCNC: 54 MG/DL (ref 40–59)
HGB BLD-MCNC: 13.4 G/DL
IMM GRANULOCYTES # BLD AUTO: 0.01 X10(3) UL (ref 0–1)
IMM GRANULOCYTES NFR BLD: 0.2 %
LDLC SERPL CALC-MCNC: 167 MG/DL (ref ?–100)
LYMPHOCYTES # BLD AUTO: 2.43 X10(3) UL (ref 1–4)
LYMPHOCYTES NFR BLD AUTO: 38.4 %
MCH RBC QN AUTO: 27.6 PG (ref 26–34)
MCHC RBC AUTO-ENTMCNC: 32.1 G/DL (ref 31–37)
MCV RBC AUTO: 86 FL
MONOCYTES # BLD AUTO: 0.44 X10(3) UL (ref 0.1–1)
MONOCYTES NFR BLD AUTO: 7 %
NEUTROPHILS # BLD AUTO: 3.33 X10 (3) UL (ref 1.5–7.7)
NEUTROPHILS # BLD AUTO: 3.33 X10(3) UL (ref 1.5–7.7)
NEUTROPHILS NFR BLD AUTO: 52.7 %
NONHDLC SERPL-MCNC: 176 MG/DL (ref ?–130)
OSMOLALITY SERPL CALC.SUM OF ELEC: 293 MOSM/KG (ref 275–295)
PLATELET # BLD AUTO: 266 10(3)UL (ref 150–450)
POTASSIUM SERPL-SCNC: 4.5 MMOL/L (ref 3.5–5.1)
PROT SERPL-MCNC: 7.6 G/DL (ref 5.7–8.2)
RBC # BLD AUTO: 4.86 X10(6)UL
SODIUM SERPL-SCNC: 141 MMOL/L (ref 136–145)
TRIGL SERPL-MCNC: 54 MG/DL (ref 30–149)
TSI SER-ACNC: 1.49 UIU/ML (ref 0.55–4.78)
VLDLC SERPL CALC-MCNC: 11 MG/DL (ref 0–30)
WBC # BLD AUTO: 6.3 X10(3) UL (ref 4–11)

## 2024-12-16 PROCEDURE — 80061 LIPID PANEL: CPT | Performed by: FAMILY MEDICINE

## 2024-12-16 PROCEDURE — 80050 GENERAL HEALTH PANEL: CPT | Performed by: FAMILY MEDICINE

## 2024-12-17 NOTE — PROGRESS NOTES
HPI:    Adalgisa Valente is a 49 year old female who presents for Physical (Here for physical/Reviewed Preventative/Wellness form with patient. )     Due for c-scope.   Due for mammogram.   Due for labs.   Mood is stable with zoloft at 200 mg q daily.     Past History:   She  has a past medical history of Anxiety state, unspecified, Depression, and Visual impairment.   She  has a past surgical history that includes Breast biopsy (04/04/2019); needle biopsy right (Right, 04/2019); hysterectomy (01/13/2022); uterosacral ligament suspension (01/13/2022); posterior repair (01/13/2022); anterior repair (01/13/2022); and enterocele repair (01/13/2022).   Her family history includes Cancer (age of onset: 70) in her maternal grandmother; No Known Problems in her brother and sister; Other (age of onset: 49) in her father.   She  reports that she quit smoking about 16 years ago. Her smoking use included cigarettes. She started smoking about 24 years ago. She has a 4 pack-year smoking history. She has never used smokeless tobacco. She reports that she does not drink alcohol and does not use drugs.     She is not on any long-term medications.   She has No Known Allergies.     Current Outpatient Medications on File Prior to Visit   Medication Sig    sertraline 100 MG Oral Tab Take 2 tablets (200 mg total) by mouth daily.    Magnesium 300 MG Oral Cap Take 200 mg by mouth.     Current Facility-Administered Medications on File Prior to Visit   Medication    diazepam (VALIUM) tab 10 mg         REVIEW OF SYSTEMS:   Patient denies shortness of breath, denies chest pain and denies any recent fevers or chills.    Patient reports no urinary complaints and denies headaches or visual disturbances.   Patient denies any abdominal pain at this time. Patient has no new skin lesions.  Patient reports no acute back pain and reports no dizziness or headaches.   Patient reports no visual disturbances and reports hearing has been about the  same.   Patient reports no recent injury or trauma.               EXAM:    /72   Pulse 61   Temp 97.8 °F (36.6 °C) (Temporal)   Resp 18   Ht 5' 5\" (1.651 m)   Wt 172 lb (78 kg)   LMP 12/01/2021   SpO2 98%   BMI 28.62 kg/m²  Estimated body mass index is 28.62 kg/m² as calculated from the following:    Height as of this encounter: 5' 5\" (1.651 m).    Weight as of this encounter: 172 lb (78 kg).    General Appearance:  Alert, cooperative, no distress, appears stated age   Head:  Normocephalic, without obvious abnormality, atraumatic   Eyes:  conjunctiva/cornea is not erythematous.        Nose: No nasal drainage.    Throat: No erythema    Neck: Supple, symmetrical, trachea midline, and normal ROM  thyroid: no obvious nodules   Back:   Symmetric, no curvature, ROM normal, no CVA tenderness   Lungs:   Clear to auscultation bilaterally, respirations unlabored   Chest Wall:  No tenderness or deformity   Heart:  Regular rate and rhythm, S1, S2 normal, no murmur,   Abdomen:   Soft, non-tender, bowel sounds active. No hernia.    Genitalia:     Rectal:     Extremities: Extremities normal, atraumatic, no cyanosis or edema   Pulses: 2+ and symmetric   Skin: Skin color, texture, turgor normal, no new rashes    Lymph nodes: No obvious cervical adenopathy.    Neurologic and psych: Normal speech, Alert and oriented x 3.   Normal mood, normal insight and judgment.                    ASSESSMENT AND PLAN:   1. Routine general medical examination at a health care facility    - Comp Metabolic Panel (14) [E]; Future  - Lipid Panel [E]; Future  - CBC W Differential W Platelet [E]; Future  - TSH W Reflex To Free T4 [E]; Future    2. JOEL (generalized anxiety disorder)  -stable with ssri    3. Colon cancer screening  -due for c-scope.   - Surgery Referral - In Network    4. Visit for screening mammogram  -due for mammogram.   - 3D Mammogram Digital Screen, Bilateral (CPT=77067/07824); Future     Follow up in 12 months, sooner  prn.

## 2025-01-02 ENCOUNTER — HOSPITAL ENCOUNTER (OUTPATIENT)
Dept: MAMMOGRAPHY | Age: 50
Discharge: HOME | End: 2025-01-02
Attending: FAMILY MEDICINE
Payer: COMMERCIAL

## 2025-01-02 DIAGNOSIS — Z12.31 VISIT FOR SCREENING MAMMOGRAM: ICD-10-CM

## 2025-01-02 PROCEDURE — 77067 SCR MAMMO BI INCL CAD: CPT | Performed by: FAMILY MEDICINE

## 2025-01-02 PROCEDURE — 77063 BREAST TOMOSYNTHESIS BI: CPT | Performed by: FAMILY MEDICINE

## 2025-03-28 ENCOUNTER — OFFICE VISIT (OUTPATIENT)
Dept: INTERNAL MEDICINE CLINIC | Facility: CLINIC | Age: 50
End: 2025-03-28
Payer: COMMERCIAL

## 2025-03-28 VITALS
DIASTOLIC BLOOD PRESSURE: 78 MMHG | SYSTOLIC BLOOD PRESSURE: 118 MMHG | HEART RATE: 89 BPM | RESPIRATION RATE: 18 BRPM | WEIGHT: 174 LBS | HEIGHT: 65 IN | BODY MASS INDEX: 28.99 KG/M2

## 2025-03-28 DIAGNOSIS — F50.819 BINGE EATING DISORDER, UNSPECIFIED SEVERITY: Primary | ICD-10-CM

## 2025-03-28 DIAGNOSIS — Z51.81 ENCOUNTER FOR THERAPEUTIC DRUG MONITORING: ICD-10-CM

## 2025-03-28 PROCEDURE — 3074F SYST BP LT 130 MM HG: CPT | Performed by: NURSE PRACTITIONER

## 2025-03-28 PROCEDURE — 3078F DIAST BP <80 MM HG: CPT | Performed by: NURSE PRACTITIONER

## 2025-03-28 PROCEDURE — 3008F BODY MASS INDEX DOCD: CPT | Performed by: NURSE PRACTITIONER

## 2025-03-28 PROCEDURE — 99213 OFFICE O/P EST LOW 20 MIN: CPT | Performed by: NURSE PRACTITIONER

## 2025-03-28 NOTE — PROGRESS NOTES
[address], [address] [place] [id number]  Dept: [phone]       Date of Consult:  3/28/2025    Patient:  Adalgisa Valente  :      1975  MRN:      [id number]    Referring Provider: Found clinic on website    Chief Complaint:    Chief Complaint   Patient presents with    Weight Problem     Find us online , never had weight loss medication and is maybe open to medication, not open to surgery      SUBJECTIVE   History of Present Illness:  Adalgisa Valente has been referred to me for management recommendations and evaluation and treatment.       Patient is considering medications; no to bariatric surgery for weight loss.  Patient has eating disorder(s). Pt states she does have unresolved childhood trauma. Pt states she was overweight since age 11. Pt states she has struggled with food, diet, and over-exercising since then. Pt states she quit alcohol and cigarettes 20 years ago and that's when she started doing alex eating/disordered eating.     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  Patient lives with  and two kids (12 and 15 year olds). Pt has a lot of stress with her youngest boy for the last year in which her binge eating has gotten more out of control. Pt states she is on 10 days without a food binge right now. Pt states she has been doing hot yoga sculpt for 28 days straight right now.    On a scale of 1 to 10, patient's desire to lose weight is a 10.  On a scale of 1 to 10, patient's belief that she can lose weight is a 7.    Heaviest weight ever: 174lbs   Previous use of medical weight loss medications: none  Barriers to weight loss: binge eating disorder  Physical activity: Type: HIT, yoga sculpt Frequency: 7 days/week    Stress level: very high  Sleep: 8 hours/night    Sleep screening: - snore, tired during the day    Past Medical History:   Past Medical History:    Anxiety state, unspecified     Prozac 40mg    Depression    Prozac 40mg    Visual impairment    glasses and contacts     OBJECTIVE     Vitals: /78   Pulse 89   Resp 18   Ht 5' 5\" (1.651 m)   Wt 174 lb (78.9 kg)   LMP 12/01/2021   BMI 28.96 kg/m²      Wt Readings from Last 6 Encounters:   03/28/25 174 lb (78.9 kg)   12/16/24 172 lb (78 kg)   09/30/24 177 lb 6.4 oz (80.5 kg)   11/09/23 157 lb (71.2 kg)   10/20/23 155 lb (70.3 kg)   08/31/23 145 lb (65.8 kg)      Patient Medications:    Current Outpatient Medications   Medication Sig Dispense Refill    sertraline 100 MG Oral Tab Take 2 tablets (200 mg total) by mouth daily.      Magnesium 300 MG Oral Cap Take 200 mg by mouth.       Allergies:  Patient has no known allergies.     Comorbidities:  none    Social History:  Reviewed  Surgical History:    Past Surgical History:   Procedure Laterality Date    Anterior repair  01/13/2022    Breast biopsy  04/04/2019    R. breast biopsy     Enterocele repair  01/13/2022    Hysterectomy  01/13/2022    TVH/BS    Needle biopsy right Right 04/2019    benign fibroadenoma, dense stroma fibrosis    Posterior repair  01/13/2022    Uterosacral ligament suspension  01/13/2022     Family History:    Family History   Problem Relation Age of Onset    Other (Other) Father 49        Alcoholism    Cancer Maternal Grandmother 70        Breast Ca    No Known Problems Brother     No Known Problems Sister     Heart Disease Neg     Stroke Neg      Typical Binge:  Grilled cheese, cookies, and candy bars (3,000-4,000) then sleep - then small dinner - then bed, then wake up and cycle through 6 days of misery of binge eating, going through drive-thru - impulses take one second - then might have suicidal ideations even - then she'll wake up one day and start a new restrictive and workout plan - right now she is enjoying cooking and cooking vegan recipes     Portion sizes: +  Binge: +  Emotional: +  Depression: +  Cravings:  Yes  Sweet tooth: +  Crunchy/salty: +  Drinks:  water  Etoh: none    Nutritional Goals Reviewed and Discussed:     Calorie-controlled diet: 1680, Limit carbohydrates to 147 gms per day, Eat 100-200 calories within 1 hour of waking up, and Eat 3-4 cups of fresh fruit or vegetables daily. Protein: 126 g    Behavior Modifications Reviewed and Discussed:    Eat breakfast, Eat 3 meals per day, Plan meals in advance, Read nutrition labels, Drink 64oz of water per day, Maintain a daily food journal, No drinking 30 minutes before or after meals, Utilize portion control strategies to reduce calorie intake, Identify triggers for eating and manage cues, and Eat slowly and take 20 to 30 minutes to complete each meal    BODY SCAN results:   Current body weight: 174.5 lbs  Total body fat: 36.4%  Visceral fat: 9  Muscle Mass: 29.1lbs  Total body water: 46.2%     ROS:  Review of Systems   Constitutional: Negative.    HENT: Negative.     Respiratory: Negative.     Cardiovascular: Negative.    Gastrointestinal: Negative.    Musculoskeletal: Negative.    Psychiatric/Behavioral:  The patient is nervous/anxious.       Physical Exam:  Physical Exam  Vitals reviewed.   Constitutional:       Appearance: Normal appearance.   HENT:      Head: Normocephalic.   Eyes:      Conjunctiva/sclera: Conjunctivae normal.   Pulmonary:      Effort: Pulmonary effort is normal.   Neurological:      General: No focal deficit present.      Mental Status: She is alert and oriented to person, place, and time. Mental status is at baseline.        ASSESSMENT     Encounter Diagnosis(ses):   1. Binge eating disorder, unspecified severity    2. Encounter for therapeutic drug monitoring      PLAN     Diagnoses and all orders for this visit:    Binge eating disorder, unspecified severity  -     OP REFERRAL TO [facility]  - Pt scored high on BEDS-7 screening. Pt is aware and open about her binge eating behavior. Pt is willing to talk with a therapist. Pt states she has tried therapy in the past but longest stretch  was only for 6 months. Discussed how CBT is gold standard for management and treatment of BED.   - Pt is tired of her disordered eating and highly motivated for a life change  - Discussed medication options - for now, mutual patient-centered decision made for pt to start with therapy for the next 4 weeks. Will have pt f/u with provider in 4 weeks. At that time, if pt wants to discuss possibility of medications, then will do so. Pt is to call her insurance and ask if any medications are covered for binge eating disorder. Vyvanse could be an option. Another option could be low dose Wegovy or Zepbound injections for anti-obesity medications but to be utilized in hopes to combat the food noise and food impulses pt discusses having.    Encounter for therapeutic drug monitoring    Reviewed labs.  Last A1c value was never done.  Cholesterol: 230, done on 2024.  HDL Cholesterol: 54, done on 2024.  TriGlycerides 54, done on 2024.  LDL Cholesterol: 167, done on 2024.      Lab Results   Component Value Date    TSH 1.486 2024     No need to draw more labs today.  [id number]  Sinus bradycardia  Otherwise normal ECG     OBESITY/WEIGHT GAIN PLAN:  Medication Management: no medications at this time  Consider medication(s): Vyvanse, Wegovy, Topamax, Naltrexone in the future as needed.     Discussed:  - Recommended intensive lifestyle and behavioral modifications for weight loss.    - Discussed importance of building muscle, maintaining muscle mass, and increasing PO protein   - Educated patient on lifestyle modifications: Mediterranean/Whole Food/Plant Strong/Low Glycemic Index diet, moderate alcohol consumption, reduced sodium intake to no more than 2,400 mg/day, and at least 150-300 minutes of moderate physical activity per week. Stress can create barrier to weight loss and exercise is a great way to reduce stress.  - Discussed the importance of whole food, plant based, minimally to non-processed  diet rich in fruits, vegetables, whole grains and healthy fats, and meats/seafood without hormones, steroids, or antibiotics. Avoid processed, poor quality carbohydrates, refined grains, flour, sugar. Increase protein intake and don't skip meals.  Goals for next month:  1. Keep a food log, aim for 100 grams carbs/day. Meet with RD.   2. Drink 64 ounces of non-caloric beverages per day. No fruit juices or regular soda.  3. Aim for 150 minutes moderate exercise per week.    4. Increase fruit and vegetable servings to 5-6 per day.    5. Improve sleep and stress, both VERY important in weight loss and management.    Discussed medication options for weight loss in detail with patient, including oral and injectable agents.    - Discussed Vyvanse as an option for binge eating disorder specifically   Discussed therapy options and referrals available, pt accepted the referral at this time.   - Dr. ZHANG to reach out to patient specifically and have a consultation and set up future appointments    I spent 45 minutes of face to face time with patient, 30 minutes of which were spent on nutrition, exercise, sleep, stress, weight loss/behavioral counseling and care coordination.    Follow-up: 4 week in -person, 3 month in-person    Adalgisa Roy, MAMADOU, FNP-BC

## 2025-03-28 NOTE — PATIENT INSTRUCTIONS
Welcome to the [facility]!!    I can't wait to keep working with you on this journey that is life. Always unpredictable and curveballs are no doubt to happen, but I hope you know I'm here to help and guide you along the way.  No judgment zone remember!  Ok...  Next steps:  Dr. ZHANG is going to reach out and call you to set up cognitive therapy sessions.  Call your insurance to see if any medications are covered for binge eating disorder or anti-obesity medications.   Follow-up with me in 4 weeks!   Stay positive and open, we can definitely help you. You can look up some of the medications I mentioned like Vyvanse, Wegovy, Topamax, and Naltrexone.      Try to work on the following dietary changes this first month, tailored to your height and weight specifically, and no need to be perfect:  Daily protein recommendation to start: 126 grams  Daily carbohydrate: 147 g  Daily calories: 1680    1.  Drink water with meals and throughout the day, cut down on soda and/or juice if consumed. Consider flavored water options like Bubbly, Spindrift, Hint and Bee. Or add fresh lemon, lime, cinnamon sticks, cucumber, orange to your water!  2.  Eat breakfast daily and focus on having protein with each meal, examples include: greek yogurt, cottage cheese, hard boiled egg, whole grain toast with peanut butter/almond butter/avocado.   3.  Reduce refined carbohydrates and sugars which includes items such as sweets, as well as rice, pasta, and bread. Make sure to choose whole grain options when having them with just 1 serving per meal about the size of your inner palm. I always say, try to reduce how much you eat that comes from a bag. Yes, including chips and crackers and cookies.  4.  Consume non starchy veggies daily working towards making them a good 50% of your daily food intake. Add them to lunch and dinner consistently.  5.  Optional can start a daily probiotic: VSL#3, (order on line at  www.SNSplus.com). Take 1 capsule daily with water for 30 days, then reduce to 1 every other day (this will reduce the cost). Capsules can be left out for 2 weeks, but then must be refrigerated.      Please download nilo My Fitness Pal, LoseIt! Or MyNet Diary to monitor daily dietary intake and you will be able to see if you are eating the right amount of calories or too much or too little which would hinder weight loss. Additionally this will help to see your daily carbohydrate and protein intake. When you set the nilo up choose 1-2 lbs/week as a goal.  Keeping a paper food journal is an option as well to remain accountable for your choices- this is the start to mindful eating! A low calorie diet has been consistently shown to support weight loss.     Continue or start exercising to help establish a routine. If not already exercising begin with 1 day and progress as able with long-term goal of 30 minutes 5 days a week at a minimum. I like to say 20 min of moving your body 3 times a week and build from there. Find something you like! CleanEdisonube has free workouts and range in time frame!      Meditation and exercise daily can help manage and control stress and lead to better sleep. Chronic stress and interrupted sleep can make weight loss very difficult.  Exercising is one way to help with stress, but meditation using the CALM Nilo or another comparable alternative can be done in your home or place of work with little time commitment. This Nilo can also help work on behavior change and improve sleep. Check out the segment under Calm Masterclass and listen to The 4 Pillars of Health. A great way to begin learning about the foundation of lifestyle with practical tips to use in your every day.     Check out www.yourweightmatters.org blog for continued daily support and education along this weight loss journey!    Patient Resources:     Personal Training/Fitness Classes/Health Coaching     [facility] [place]  @ [email or web address] Full fitness center with group fitness and personal training. Discount available as client of [facility].  Health Coaching and Personal Training with Vida Martinez at our [facility]- individual weekly coaching with option to add personal training and small group fitness classes targeted at weight loss- [phone] and/or email @ [email or web address]  [employer] [place] [email or web address] Group Fitness [phone] and/or email Amanda at [email or web address]  [facility] [place] Fitness Centers with multiple locations: AirPlug (www.Opternative), Akimbo LLC (www."BlueInGreen, LLC"), Chef Dovunque (www.Seven Seas Water), The Exercise  (www.exercisecoachBreakmoon.com)     Online Fitness  Fitness  on Lipocalyxube  Sit and Be Fit - Chair exercise series Www.sitandbefit.LocalVox Media     Apps for on the Go Fitness  MyEnergy 7 Minute Workout (orange box with white 7) - free on the go HIIT training nilo  Ramenn Nilo @ www.onepeloton.com - I LOVE this nilo - and annual membership is like $200 you don't even need a [employer] equipment, but they have 5min, 10min, 15min, etc up to 60min workouts and it feels like you are one-on-one with a  - they have multiple trainers and levels of their program! they have bodyweight sessions, variations in times of classes, great motivator to have someone doing their workout with you!     Nutrition Trackers and Tools  LoseIT! And My Fitness Pal apps and MyNetDiary  NOOM - virtual health coaching  FitFoundation (healthy meals on the go) in [place] @ [email or web address]  Fahad RAMIREZ @ www.bistromd.com and Xqajzt11 (keto and low carb plans recommended) @ www.micpky12.com, Metabolic Meals @ www.Apex ConstructionMetabolicMeals.com - individual prepared meals to go  Gobble, Blue Apron, Home , Every Plate, Sunbasket- on line meal delivery programs for preparation at  home  [place] in [place] for homemade meals to go @ www.Per Vices  Diet Doctor @ www.dietdoctor.com - low carb swaps  Yummly - meal prep and planning kimani (www.yummly.com)     Stress Management/Behavior/Mindful Eating  CALM meditation kimani (www.calm.com)  Headspace  Am I Hungry? Mindful eating virtual  kimani  Www.yourweightmatters.org - Obesity Action Coalition sponsored Blog posts daily  Motivation kimani (black box with white \")- daily supportive messages sent to your phone  Aura - pretty cheap, like $70 for the year subscription - as short as 4min meditation or as long as you want, has nighttime calming music for sleep too!     Books/Video Education/Podcasts  Mindless Eating by Gokul Bustos  Why We Get Sick by Emile Novak (a book about insulin resistance)  Atomic Habits by Dale Mcallister (a book about taking small steps to promote greater behavior change)   Can't Hurt Me by Chauncey Hanks (a book exploring the power of discipline in achieving your goals)  Your Body in Balance: The New Science of Food, Hormones, and Health by Dr. Hung Brown  The Menopause Diet Plan by Demi Miles and Susana Espana  The Complete Guide to fasting by Dr. Castillo  Sugar, Salt & Fat by Galilea Fink, Ph.D, R.D.  Weight Loss Surgery Will Not Treat Food Addiction by Fay Anguiano Ph.D  The Game Changers- [employer] Documentary on plant based nutrition  Fed Up - documentary about obesity (Free on Utube)  The Truth About Sugar - documentary on sugar (Free on Utube, https://youtu.be/6L7sctzHB9v)  The Dr. Mendoza T5 Wellness Plan by Dr. Ranjit Mendoza MD  Fitlosophy Fitspiration - journal to better health (found at [employer] in fitness aisle)  What Happened to You?- a look at the impact trauma has on behavior written by Simon Lizarraga and Dr. Сергей Stewart  Whole Again by [employer] - discovering your true self after trauma  Belinda Bains talk on [employer], The Call to Courage  Podcasts: The Exam Room by the Physician's Committee,  Nutrition Facts by Dr. Merly Caceres and Landmines by Сергей Bains - A diabetes guide  Why Zebras don't get ulcers - book on the effects of chronic stress!    We are here to support you with weight loss, but please remember that you still need your primary care provider for your routine health maintenance.

## 2025-04-01 ENCOUNTER — TELEPHONE (OUTPATIENT)
Facility: CLINIC | Age: 50
End: 2025-04-01

## 2025-04-01 ENCOUNTER — OFFICE VISIT (OUTPATIENT)
Facility: CLINIC | Age: 50
End: 2025-04-01
Payer: COMMERCIAL

## 2025-04-01 VITALS
DIASTOLIC BLOOD PRESSURE: 88 MMHG | WEIGHT: 176 LBS | BODY MASS INDEX: 29.32 KG/M2 | HEIGHT: 65 IN | SYSTOLIC BLOOD PRESSURE: 130 MMHG

## 2025-04-01 DIAGNOSIS — N95.1 VASOMOTOR SYMPTOMS DUE TO MENOPAUSE: ICD-10-CM

## 2025-04-01 DIAGNOSIS — Z00.00 ANNUAL PHYSICAL EXAM: ICD-10-CM

## 2025-04-01 DIAGNOSIS — Z90.710 S/P VAGINAL HYSTERECTOMY: ICD-10-CM

## 2025-04-01 DIAGNOSIS — Z12.6 BLADDER CANCER SCREENING: Primary | ICD-10-CM

## 2025-04-01 PROBLEM — N81.4 UTEROVAGINAL PROLAPSE: Status: RESOLVED | Noted: 2019-02-05 | Resolved: 2025-04-01

## 2025-04-01 PROCEDURE — 3075F SYST BP GE 130 - 139MM HG: CPT | Performed by: OBSTETRICS & GYNECOLOGY

## 2025-04-01 PROCEDURE — 3008F BODY MASS INDEX DOCD: CPT | Performed by: OBSTETRICS & GYNECOLOGY

## 2025-04-01 PROCEDURE — 3079F DIAST BP 80-89 MM HG: CPT | Performed by: OBSTETRICS & GYNECOLOGY

## 2025-04-01 PROCEDURE — 99396 PREV VISIT EST AGE 40-64: CPT | Performed by: OBSTETRICS & GYNECOLOGY

## 2025-04-01 RX ORDER — ESTRADIOL 0.5 MG/1
0.5 TABLET ORAL DAILY
Qty: 90 TABLET | Refills: 1 | Status: SHIPPED | OUTPATIENT
Start: 2025-04-01

## 2025-04-01 NOTE — PROGRESS NOTES
GYN H&P     Genetic questionnaire reviewed with the patient and she will be referred for genetic counseling if the questionnaire had any positive results.    The [facility] intake form was also reviewed regarding contraception, menstrual periods, urinary health, and vaginal / sexual health    2025  9:03 AM    Chief Complaint   Patient presents with    Physical     Pt reports night sweats, hot flashes, weight gain, joint pain, mood irritability intensifying for the past year        HPI: Adalgisa is a 50 year old  Patient's last menstrual period was 2021.  (contraception: hyst for prolapse) here for her annual gyn exam.     She has no complaints.  Denies any pelvic or breast complaints.     Lost of VM symptoms now - mother went into menopause around 54.      Previous encounters and chart reviewed.     OB History    Para Term  AB Living   3 2 2   1 2   SAB IAB Ectopic Multiple Live Births   1       2      # Outcome Date GA Lbr Antoine/2nd Weight Sex Type Anes PTL Lv   3 Term 12 40w0d  9 lb 2 oz (4.139 kg) M NORMAL SPONT   JOLYNN   2 Term 10/16/09 40w0d  9 lb 7 oz (4.281 kg) M NORMAL SPONT   JOLYNN   1 SAB                GYN hx:   Menarche: 11  Period Cycle (Days): hysterectomy  Period Duration (Days): 5-10  Period Flow: light  Use of Birth Control (if yes, specify type): Hysterectomy  Hx Prior Abnormal Pap: Yes  Pap Date: 19  Pap Result Notes: 2019 Neg/Neg, 2013 Normal  Follow Up Recommendation:       Past Medical History:    Anxiety state, unspecified    Prozac 40mg    Depression    Prozac 40mg    Visual impairment    glasses and contacts     Past Surgical History:   Procedure Laterality Date    Anterior repair  2022    Breast biopsy  2019    R. breast biopsy     Enterocele repair  2022    Hysterectomy  2022    TVH/BS    Needle biopsy right Right 2019    benign fibroadenoma, dense stroma fibrosis    Posterior repair  2022     Uterosacral ligament suspension  2022     Allergies[1]  Medications Ordered Prior to Encounter[2]  Family History   Problem Relation Age of Onset    Other (Other) Father 49        Alcoholism    Cancer Maternal Grandmother 70        Breast Ca    No Known Problems Brother     No Known Problems Sister     Heart Disease Neg     Stroke Neg      Social History     Socioeconomic History    Marital status:      Spouse name: Not on file    Number of children: Not on file    Years of education: Not on file    Highest education level: Not on file   Occupational History    Not on file   Tobacco Use    Smoking status: Former     Current packs/day: 0.00     Average packs/day: 0.5 packs/day for 8.0 years (4.0 ttl pk-yrs)     Types: Cigarettes     Start date: 2000     Quit date: 2008     Years since quittin.1    Smokeless tobacco: Never   Vaping Use    Vaping status: Former    Substances: THC    Devices: Disposable, Pre-filled or refillable cartridge, Refillable tank   Substance and Sexual Activity    Alcohol use: No    Drug use: No    Sexual activity: Yes     Partners: Male     Birth control/protection: Hysterectomy     Comment: withdrawal   Other Topics Concern    Caffeine Concern Not Asked    Exercise Not Asked    Seat Belt Not Asked    Special Diet Not Asked    Stress Concern Not Asked    Weight Concern Not Asked   Social History Narrative    Not on file     Social Drivers of Health     Food Insecurity: No Food Insecurity (2025)    NCSS - Food Insecurity     Worried About Running Out of Food in the Last Year: No     Ran Out of Food in the Last Year: No   Transportation Needs: No Transportation Needs (2025)    NCSS - Transportation     Lack of Transportation: No   Stress: Not on file   Housing Stability: Not At Risk (2025)    NCSS - Housing/Utilities     Has Housing: Yes     Worried About Losing Housing: No     Unable to Get Utilities: No       ROS:     Review of Systems:  General: denies  fevers, chills, fatigue and malaise.   Eyes: no visual changes, denies headaches  ENT: no complaints, denies earaches, runny nose, epistaxis, throat pain or sore throat  Respiratory: denies SOB, dyspnea, cough or wheezing  Cardiovascular: denies chest pain, palpitations, exercise intolerance   GI: denies abdominal pain, diarrhea, constipation  : no complaints, denies dysuria, increased urinary frequency, no dyspareunia   Hematological/lymphatic: denies history of excessive bleeding or bruising, denies dizziness, lightheadedness.   Breast: denies rashes, skin changes, pain, lumps or discharge   Psychiatric: denies depression, changes in sleep patterns, anxiety  Endocrine: denies hot or cold intolerance, mood changes   Neurological: denies changes in sight, smell, hearing or taste. Denies seizures or tremors  Immunological: denies allergies, denies anaphylaxis, or swollen lymph nodes  Musculoskeletal: denies joint pain, morning stiffness, decreased range of motion         O /88   Ht 65\"   Wt 176 lb (79.8 kg)   LMP 12/01/2021   BMI 29.29 kg/m²         Wt Readings from Last 6 Encounters:   04/01/25 176 lb (79.8 kg)   03/28/25 174 lb (78.9 kg)   12/16/24 172 lb (78 kg)   09/30/24 177 lb 6.4 oz (80.5 kg)   11/09/23 157 lb (71.2 kg)   10/20/23 155 lb (70.3 kg)     Exam:   GENERAL: well developed, well nourished, in no apparent distress, oriented.  SKIN: no rashes, no suspicious lesions  HEENT: normal  NECK: supple; no thyromegaly, no adenopathy  LUNGS: clear to auscultation  CARDIOVASCULAR: normal S1, S2, RRR  BREASTS: soft, nontender, no palpable masses or nodes, no nipple discharge, no skin changes, no axillary adenopathy  ABDOMEN: no scars,  soft, non distended; non tender, no masses  PELVIC: External Genitalia: Normal appearing, no lesions.    Vagina: normal pink mucosa, no lesions, normal clear discharge.    Bladder well supported.  No  anterior or posterior hernias    Cuff well healed and supported     Adnexa: non tender, no masses, normal size    Rectal: deferred  EXTREMITIES:  non tender without edema        A/P: Patient is 50 year old female with no complaints. Here for well woman exam.       Patient counseled on:    Diet/exercise.      Self Breast Exams     Safe sex practices / and living environment     Vaccines:  Annual Flu, Tdap +/- Gardasil not recommended (up to 45 yrs).      Pneumococcal at 65 yrs old, Shingles at 60 yrs old          Pap: na  GC/Chlamydia:  na  Mammogram:  negative,  2025  Dexa:  na  Colonoscopy: encouraged - number given  Lipid / Cholesterol:    Lipid Panel [E] [[id number]] (Abnormal)  Resulted: 12/16/24 1808, Result status: Final result  Resulting lab: [facility] ([facility])     Specimen Information    ID Type Source Collected On   [id number] Blood -- 12/16/24 1215     Components    Component Value Reference Range Flag   Cholesterol, Total 230 <200 mg/dL H High    Comment: Desirable  <200 mg/dL  Borderline  200-239 mg/dL  High      >=240 mg/dL     HDL Cholesterol 54 40 - 59 mg/dL --   Comment:      Interpretive Information:  An HDL cholesterol <40 mg/dL is low and constitutes a coronary heart disease risk factor. An HDL cholesterol >60 mg/dL is a negative risk factor for coronary heart disease.     Triglycerides 54 30 - 149 mg/dL --   Comment:      Reference interval for fasting triglycerides  Desirable: <150 mg/dL  Borderline: 150-199 mg/dL  High: 200-499 mg/dL  Very High: >=500 mg/dL       LDL Cholesterol 167 <100 mg/dL H High    Comment:      Optimal            <100 mg/dL  Near/Above OptimaL 100-129 mg/dL  Borderline High    130-159 mg/dL  High               160-189 mg/dL    Very High          >190 mg/dL     VLDL 11 0 - 30 mg/dL --   Non HDL Chol 176 <130 mg/dL H High    Comment:      Desirable  <130 mg/dL  Borderline  130-159 mg/dL  High        160-189 mg/dL      Very high >=190 mg/dL            Meds This Visit:    Requested Prescriptions     Signed  Prescriptions Disp Refills    estradiol (ESTRACE) 0.5 MG Oral Tab 90 tablet 1     Sig: Take 1 tablet (0.5 mg total) by mouth daily.       1. Bladder cancer screening  - URINALYSIS, AUTO, W/O SCOPE    2. Annual physical exam    3. S/P vaginal hysterectomy - repair or hernias, USLS - prolapse 2022    4. Vasomotor symptoms due to menopause  - estradiol (ESTRACE) 0.5 MG Oral Tab; Take 1 tablet (0.5 mg total) by mouth daily.  Dispense: 90 tablet; Refill: 1    Vit. D and exercise    Return in about 3 months (around 7/1/2025) for Office Visit.    [facility] 2022 Key Points - updated WHI 20 year follow up.    Lowest possible dose, shortest duration, only symptomatic women constantly weighing the risks vs benefits  Women 60 or younger or 10 years from menopause is key demographic - can continue if symptomatic  CAREFUL STARTING MORE THAN 10 YEARS AFTER MENOPAUSE - benefits less favorable  Absolute risks of HRT and ERT are very rare.  Having a glass of alcohol a day has same risk of breast cancer as HRT  Absolute risk of all forms of mortality, fracture, breast cancer, and diabetes all  less for women on HRT/ERT for women younger than 60  Increased risk of VTE, gal bladder, - CV and stroke in older patients only, prevention in younger patients    Four indications: VMS, prevention of osteoporosis, treatment of premature menopause, VV symptoms.     Nightly Micronized progesterone helps with hot flashes if E contraindicated.   Compounded bioidenticals safety concerns- only if allergic to FDA approved.     Micronized 18B estradiol is bioidentical as is micronized progesterone    Treat women in premature menopause (Primary ovarian insufficiency) until 52 - longer if symptomatic  WHI does not apply to these women    Benefit to hair, skin, nails, collagen, can help with open-angle glaucoma    Helps prevent muscle waisting    Prevents dementia in women younger than 65, may increase risk in older women    Prevents CHD in younger women -  reducing the risk of blood clot, heart attack and stroke - worsens in older women.     Less than one additional women with breast cancer  per 1,000 users annually = one glass of alcohol/day and less than two glasses of alcohol / day -ERT ONLY,   ERT - NO INCREASE IN BREAST CANCER RISK AND MAY REDUCE    DuaVee - no increased risk - probably reduces risk of breast cancer.     Ok in BRCA,  with family hx of breast cancer  and hx of most genital cancers except hormone receptor breast cancer for systemic hormones, vaginal Ok     Does not increase endometrial CA recurrence    OCP's significant reduction in ovarian cancer - persists for up to 30 years    HRT/ERT reduces risk of colon cancer and morality                Paulie Lawrence MD   4/1/2025  9:03 AM       This note was created by Dragon voice recognition. Errors in content may be related to improper recognition by the system; efforts to review and correct have been done but errors may still exist. Please contact me with any questions.    Note to patient and family   The 21st Century Cures Act makes medical notes available to patients in the interest of transparency.  However, please be advised that this is a medical document.  It is intended as [id number] communication.  It is written in medical language which may contain abbreviations or verbiage that are technical and unfamiliar.  It may appear blunt or direct.  Medical documents are intended to carry relevant information, facts as evident, and the clinical opinion of the practitioner.           [1] No Known Allergies  [2]   Current Outpatient Medications on File Prior to Visit   Medication Sig Dispense Refill    sertraline 100 MG Oral Tab Take 2 tablets (200 mg total) by mouth daily.      Magnesium 300 MG Oral Cap Take 200 mg by mouth.       Current Facility-Administered Medications on File Prior to Visit   Medication Dose Route Frequency Provider Last Rate Last Admin    diazepam (VALIUM) tab 10 mg  10 mg  Oral Once Susana Murillo MD

## 2025-04-07 ENCOUNTER — OFFICE VISIT (OUTPATIENT)
Dept: FAMILY MEDICINE CLINIC | Facility: CLINIC | Age: 50
End: 2025-04-07
Payer: COMMERCIAL

## 2025-04-07 VITALS
DIASTOLIC BLOOD PRESSURE: 70 MMHG | RESPIRATION RATE: 16 BRPM | HEIGHT: 65 IN | SYSTOLIC BLOOD PRESSURE: 110 MMHG | OXYGEN SATURATION: 98 % | BODY MASS INDEX: 29 KG/M2 | HEART RATE: 68 BPM

## 2025-04-07 DIAGNOSIS — J01.10 ACUTE NON-RECURRENT FRONTAL SINUSITIS: Primary | ICD-10-CM

## 2025-04-07 PROCEDURE — 3078F DIAST BP <80 MM HG: CPT | Performed by: NURSE PRACTITIONER

## 2025-04-07 PROCEDURE — 3074F SYST BP LT 130 MM HG: CPT | Performed by: NURSE PRACTITIONER

## 2025-04-07 PROCEDURE — 3008F BODY MASS INDEX DOCD: CPT | Performed by: NURSE PRACTITIONER

## 2025-04-07 PROCEDURE — 99214 OFFICE O/P EST MOD 30 MIN: CPT | Performed by: NURSE PRACTITIONER

## 2025-04-07 NOTE — PROGRESS NOTES
Adalgisa Valente is a 50 year old female.   Chief Complaint   Patient presents with    Sinus Problem     Sinus drainage, sinus pressure 5 days      HPI:    Symptoms started  5  days ago with purulent nasal discharge, frontal sinus pressure, and headache, a lot of  post-nasal drip.Worsening since  the onset , reports vertigo     Allergies:  Allergies[1]       Current Outpatient Medications   Medication Sig Dispense Refill    estradiol (ESTRACE) 0.5 MG Oral Tab Take 1 tablet (0.5 mg total) by mouth daily. 90 tablet 1    sertraline 100 MG Oral Tab Take 2 tablets (200 mg total) by mouth daily.      Magnesium 300 MG Oral Cap Take 200 mg by mouth.        Past Medical History:    Anxiety state, unspecified    Prozac 40mg    Depression    Prozac 40mg    Visual impairment    glasses and contacts      Past Surgical History:   Procedure Laterality Date    Anterior repair  01/13/2022    Breast biopsy  04/04/2019    R. breast biopsy     Enterocele repair  01/13/2022    Hysterectomy  01/13/2022    TVH/BS    Needle biopsy right Right 04/2019    benign fibroadenoma, dense stroma fibrosis    Posterior repair  01/13/2022    Uterosacral ligament suspension  01/13/2022            ROS:   GENERAL HEALTH: otherwise feels well  EYES: no visual complaints or deficits  RESPIRATORY: no shortness of breath, wheezing   CARDIOVASCULAR: no chest pain or SOB.  GI: denies nausea, vomiting.  PSYCHE: no symptoms of depression or anxiety      PE:  /70   Pulse 68   Resp 16   Ht 5' 5\" (1.651 m)   LMP 12/01/2021   SpO2 98%   BMI 29.29 kg/m²   General: WD/WN in no acute distress.   Eyes & ears: conjunctiva clear, sclera white; TM’s non-bulging without erythema.   Sinuses  frontal: tender to percussion.   Nares: red mucosa and thick yellow discharge, no septal deviations.   Mouth: moist with mild erythema, no exudates, and + PND.   Neck: Supple with no cervical LAD.   Lungs: Clear to auscultation bilaterally; no wheeze, rhonchi, or  rales.    Heart: S1/S2 regular no murmurs.            ASSESSMENT/ PLAN:       Diagnoses and all orders for this visit:    Acute non-recurrent frontal sinusitis  -     amoxicillin clavulanate 875-125 MG Oral Tab; Take 1 tablet by mouth 2 (two) times daily for 10 days.       Increase fluids, rest, Tylenol or Ibuprofen prn, f/u in 5 days if not better.                [1] No Known Allergies

## 2025-04-08 LAB
BILIRUBIN: NEGATIVE
GLUCOSE (URINE DIPSTICK): NEGATIVE MG/DL
KETONES (URINE DIPSTICK): NEGATIVE MG/DL
LEUKOCYTES: NEGATIVE
NITRITE, URINE: NEGATIVE
OCCULT BLOOD: NEGATIVE
PH, URINE: 6.5 (ref 4.5–8)
PROTEIN (URINE DIPSTICK): NEGATIVE MG/DL
SPECIFIC GRAVITY: 1.02 (ref 1–1.03)
UROBILINOGEN,SEMI-QN: 0.2 MG/DL (ref 0–1.9)

## 2025-06-23 ENCOUNTER — OFFICE VISIT (OUTPATIENT)
Facility: CLINIC | Age: 50
End: 2025-06-23
Payer: MEDICAID

## 2025-06-23 VITALS — BODY MASS INDEX: 29 KG/M2 | SYSTOLIC BLOOD PRESSURE: 118 MMHG | HEIGHT: 65 IN | DIASTOLIC BLOOD PRESSURE: 66 MMHG

## 2025-06-23 DIAGNOSIS — N95.1 VASOMOTOR SYMPTOMS DUE TO MENOPAUSE: Primary | ICD-10-CM

## 2025-06-23 DIAGNOSIS — N95.9 PERIMENOPAUSAL DISORDER: ICD-10-CM

## 2025-06-23 DIAGNOSIS — Z79.890 HORMONE REPLACEMENT THERAPY (HRT): ICD-10-CM

## 2025-06-23 RX ORDER — SERTRALINE HYDROCHLORIDE 100 MG/1
200 TABLET, FILM COATED ORAL DAILY
Qty: 180 TABLET | Refills: 3 | Status: SHIPPED | OUTPATIENT
Start: 2025-06-23

## 2025-06-23 RX ORDER — ESTRADIOL 1 MG/1
1 TABLET ORAL DAILY
Qty: 90 TABLET | Refills: 2 | Status: SHIPPED | OUTPATIENT
Start: 2025-06-23

## 2025-06-23 NOTE — PROGRESS NOTES
Gynecology Office Visit    The patient was offered a medical chaperone during the visit.    Adalgisa Valente is a 50 year old female  Patient's last menstrual period was 2021. (contraception:  Hysterectomy)     HPI:     Chief Complaint   Patient presents with    Follow - Up     Here for estrace fu. States night sweats and hot flashes have gone away. Pt would like to increase dose if possible.     Patient was started on ERT 0.5 mg in April for night sweats, hot flashes, weight gain, joint pain and mood irritability.     Here for a 3 month follow up. She reports that her symptoms have gotten better, but is still having some mood irregularity, joint pain. She states her hot flashes and night sweats have almost completely gone away.    She would like to go up on the ERT to help with the rest of her symptoms.     She states that she has been seeing a psychiatrist for anxiety and has been taking zoloft for 4 years that has help. She now wants to stop seeing her psychiatrist and start to lower her dose.    Gonzalo ABMROSE     Chart and previous encounters reviewed.  HISTORY:  Past Medical History[1]   Past Surgical History[2]   Family History[3]   Social History: Short Social Hx on File[4]     Medications (Active prior to today's visit):  Current Medications[5]    Allergies:  Allergies[6]    Gyn:  Menarche: 11  Period Cycle (Days): hysterectomy  Period Duration (Days): 5-10  Period Flow: light  Use of Birth Control (if yes, specify type): Hysterectomy  Hx Prior Abnormal Pap: Yes  Pap Date: 19  Pap Result Notes: 2019 Neg/Neg, 2013 Normal  Follow Up Recommendation:     OB Hx:  OB History    Para Term  AB Living   3 2 2  1 2   SAB IAB Ectopic Multiple Live Births   1    2      # Outcome Date GA Lbr Antoine/2nd Weight Sex Type Anes PTL Lv   3 Term 12 40w0d  9 lb 2 oz (4.139 kg) M NORMAL SPONT   JOLYNN   2 Term 10/16/09 40w0d  9 lb 7 oz (4.281 kg) M NORMAL SPONT   JOLYNN   1  SAB                  ROS:     10 point ROS completed and was negative, except for pertinent positive and negatives stated in the HPI.    PHYSICAL EXAM:   /66   Ht 65\"   LMP 12/01/2021   BMI 29.29 kg/m²      Wt Readings from Last 6 Encounters:   04/01/25 176 lb (79.8 kg)   03/28/25 174 lb (78.9 kg)   12/16/24 172 lb (78 kg)   09/30/24 177 lb 6.4 oz (80.5 kg)   11/09/23 157 lb (71.2 kg)   10/20/23 155 lb (70.3 kg)        Gen:  Oriented, in no acute distress         ASSESSMENT/PLAN:     1. Vasomotor symptoms due to menopause  - estradiol (ESTRACE) 1 MG Oral Tab; Take 1 tablet (1 mg total) by mouth daily.  Dispense: 90 tablet; Refill: 2    2. Estrogen replacement therapy (ERT) - 2025    3. Perimenopausal disorder  - sertraline 100 MG Oral Tab; Take 2 tablets (200 mg total) by mouth daily.  Dispense: 180 tablet; Refill: 3      Discussed weaning off Zoloft - currently cuts them in half .  Feels that her anxiety is much better on ERT.  Increase of ERT might not help with joint issues noted when exercising.   Celebrex??      Meds This Visit:  Requested Prescriptions     Signed Prescriptions Disp Refills    estradiol (ESTRACE) 1 MG Oral Tab 90 tablet 2     Sig: Take 1 tablet (1 mg total) by mouth daily.    sertraline 100 MG Oral Tab 180 tablet 3     Sig: Take 2 tablets (200 mg total) by mouth daily.       Imaging & Referrals:  None     Return in about 10 months (around 4/23/2026) for Well Woman Exam.      Paulie Lawrence MD  6/23/2025  2:48 PM         This note was created by [employer] voice recognition. Errors in content may be related to improper recognition by the system; efforts to review and correct have been done but errors may still exist. Please contact me with any questions.    Note to patient and family   The 21st Century Cures Act makes medical notes available to patients in the interest of transparency.  However, please be advised that this is a medical document.  It is intended as [id number]  communication.  It is written and medical language may contain abbreviations or verbiage that are technical and unfamiliar.  It may appear blunt or direct.  Medical documents are intended to carry relevant information, facts as evident, and the clinical opinion of the practitioner.           [1]   Past Medical History:   Anxiety state, unspecified    Prozac 40mg    Depression    Prozac 40mg    Visual impairment    glasses and contacts   [2]   Past Surgical History:  Procedure Laterality Date    Anterior repair  2022    Breast biopsy  2019    R. breast biopsy     Enterocele repair  2022    Hysterectomy  2022    TVH/BS    Needle biopsy right Right 2019    benign fibroadenoma, dense stroma fibrosis    Posterior repair  2022    Uterosacral ligament suspension  2022   [3]   Family History  Problem Relation Age of Onset    Other (Other) Father 49        Alcoholism    Cancer Maternal Grandmother 70        Breast Ca    No Known Problems Brother     No Known Problems Sister     Heart Disease Neg     Stroke Neg    [4]   Social History  Socioeconomic History    Marital status:    Tobacco Use    Smoking status: Former     Current packs/day: 0.00     Average packs/day: 0.5 packs/day for 8.0 years (4.0 ttl pk-yrs)     Types: Cigarettes     Start date: 2000     Quit date: 2008     Years since quittin.4    Smokeless tobacco: Never   Vaping Use    Vaping status: Former    Substances: THC    Devices: Disposable, Pre-filled or refillable cartridge, Refillable tank   Substance and Sexual Activity    Alcohol use: No    Drug use: No    Sexual activity: Yes     Partners: Male     Birth control/protection: Hysterectomy     Comment: withdrawal     Social Drivers of Health     Food Insecurity: No Food Insecurity (2025)    NCSS - Food Insecurity     Worried About Running Out of Food in the Last Year: No     Ran Out of Food in the Last Year: No   Transportation Needs: No  Transportation Needs (4/1/2025)    NCSS - Transportation     Lack of Transportation: No   Housing Stability: Not At Risk (4/1/2025)    NCSS - Housing/Utilities     Has Housing: Yes     Worried About Losing Housing: No     Unable to Get Utilities: No   [5]   Current Outpatient Medications   Medication Sig Dispense Refill    estradiol (ESTRACE) 1 MG Oral Tab Take 1 tablet (1 mg total) by mouth daily. 90 tablet 2    sertraline 100 MG Oral Tab Take 2 tablets (200 mg total) by mouth daily. 180 tablet 3    Magnesium 300 MG Oral Cap Take 200 mg by mouth.     [6] No Known Allergies

## (undated) DEVICE — SUTURE VICRYL 0 CT-1

## (undated) DEVICE — RETRACTOR LONE STAR STAYS LG

## (undated) DEVICE — BAG DRAIN INFECTION CNTRL 2000

## (undated) DEVICE — Device

## (undated) DEVICE — 3M™ STERI-DRAPE™ INSTRUMENT POUCH 1018: Brand: STERI-DRAPE™

## (undated) DEVICE — SCD SLEEVE KNEE HI BLEND

## (undated) DEVICE — #15 STERILE STAINLESS BLADE: Brand: STERILE STAINLESS BLADES

## (undated) DEVICE — DRAPE HALF 40X58 DYNJP2410

## (undated) DEVICE — TUBING CYSTO

## (undated) DEVICE — STANDARD HYPODERMIC NEEDLE,POLYPROPYLENE HUB: Brand: MONOJECT

## (undated) DEVICE — SUTURE VICRYL 1 CT-1

## (undated) DEVICE — COVER,MAYO STAND,STERILE: Brand: MEDLINE

## (undated) DEVICE — GAUZE,PACKING STRIP,IODOFORM,1/2"X5YD,ST: Brand: CURAD

## (undated) DEVICE — #10 STERILE BLADE: Brand: POLYMER COATED BLADES

## (undated) DEVICE — MEDI-VAC NON-CONDUCTIVE SUCTION TUBING: Brand: CARDINAL HEALTH

## (undated) DEVICE — GYN CDS: Brand: MEDLINE INDUSTRIES, INC.

## (undated) DEVICE — SUTURE VICRYL 2-0 CT-1

## (undated) DEVICE — USE ITEM #176901

## (undated) DEVICE — NEEDLE SPINAL 22X3-1/2 BLK

## (undated) DEVICE — DECANTER BAG 9": Brand: MEDLINE INDUSTRIES, INC.

## (undated) DEVICE — CAUTERY PENCIL

## (undated) DEVICE — SOL  .9 1000ML BTL

## (undated) DEVICE — STERILE POLYISOPRENE POWDER-FREE SURGICAL GLOVES: Brand: PROTEXIS

## (undated) DEVICE — KENDALL SCD EXPRESS SLEEVES, THIGH LENGTH, MEDIUM: Brand: KENDALL SCD

## (undated) DEVICE — REM POLYHESIVE ADULT PATIENT RETURN ELECTRODE: Brand: VALLEYLAB

## (undated) DEVICE — SYRINGE 30ML LL TIP

## (undated) NOTE — Clinical Note
I had the pleasure of seeing Rehann Brunner on 3/12/2019. Please see my attached note. Luciana Garces MD [facility]AC[facility]--Surgery

## (undated) NOTE — Clinical Note
I had the pleasure of seeing Christine Rankin on 4/17/2019. Please see my attached note. Harsha Mccloud MD [facility]--Surgery

## (undated) NOTE — Clinical Note
Dear Dr. Chauhan Miss,       Thank you for referring Eva Bradley to the [facility].   Sincerely,  LINDA Chirinos

## (undated) NOTE — MR AVS SNAPSHOT
[facility]  [address]  [phone]               Thank you for choosing us for your health care visit with Krystal Mireles NP. We are glad to serve you and happy to provide you with this summary of your visit.   Ple Sometimes the semicircular canals swell and send incorrect balance signals. This problem may be caused by a viral infection. Depending on the cause, your hearing can be affected (labyrinthitis). Or your hearing can remain normal (neuronitis).   Infection or Take 1 tablet (25 mg total) by mouth 3 (three) times daily as needed for Dizziness.    Commonly known as:  ANTIVERT                Where to Get Your Medications      These medications were sent to [facility] [address] [facility], [address] [facility] 726-23

## (undated) NOTE — LETTER
11/22/19        [address] [place] [place] [id number]      Dear Gualberto Gant,    [facility] records indicate that you have outstanding lab work and or testing that was ordered for you and has not yet been completed:  Orders Placed This [place]

## (undated) NOTE — LETTER
Date: 8/28/2024    Patient Name: Adalgisa Valente          To Whom it may concern:    This letter has been written at the patient's request. The above patient was seen at [facility] for treatment of a medical condition..    The patient may return to work/school on 09/03/24 with the following limitations none.      Sincerely,      Maricel Rizzo, NP

## (undated) NOTE — LETTER
Date: 11/9/2023    Patient Name: Shira Swanson          To Whom it may concern: This letter has been written at the patient's request. The above patient was seen at the [facility] for treatment of a medical condition. This patient should be excused from attending work/school from days missed. The patient may return to work/school on 11/13 with the following limitations none.         Sincerely,    MATEUS Sinclair